# Patient Record
Sex: MALE | Race: WHITE | NOT HISPANIC OR LATINO | Employment: OTHER | URBAN - METROPOLITAN AREA
[De-identification: names, ages, dates, MRNs, and addresses within clinical notes are randomized per-mention and may not be internally consistent; named-entity substitution may affect disease eponyms.]

---

## 2019-01-01 ENCOUNTER — APPOINTMENT (OUTPATIENT)
Dept: GENERAL RADIOLOGY | Facility: HOSPITAL | Age: 79
End: 2019-01-01

## 2019-01-01 ENCOUNTER — HOSPITAL ENCOUNTER (INPATIENT)
Facility: HOSPITAL | Age: 79
LOS: 8 days | Discharge: HOSPICE | End: 2019-05-08
Attending: INTERNAL MEDICINE | Admitting: UROLOGY

## 2019-01-01 ENCOUNTER — TRANSCRIBE ORDERS (OUTPATIENT)
Dept: ADMINISTRATIVE | Facility: HOSPITAL | Age: 79
End: 2019-01-01

## 2019-01-01 ENCOUNTER — APPOINTMENT (OUTPATIENT)
Dept: CT IMAGING | Facility: HOSPITAL | Age: 79
End: 2019-01-01

## 2019-01-01 ENCOUNTER — HOSPITAL ENCOUNTER (OUTPATIENT)
Dept: CT IMAGING | Facility: HOSPITAL | Age: 79
Discharge: HOME | End: 2019-04-30

## 2019-01-01 ENCOUNTER — READMISSION MANAGEMENT (OUTPATIENT)
Dept: CALL CENTER | Facility: HOSPITAL | Age: 79
End: 2019-01-01

## 2019-01-01 ENCOUNTER — ANESTHESIA EVENT (OUTPATIENT)
Dept: PERIOP | Facility: HOSPITAL | Age: 79
End: 2019-01-01

## 2019-01-01 ENCOUNTER — ANESTHESIA (OUTPATIENT)
Dept: PERIOP | Facility: HOSPITAL | Age: 79
End: 2019-01-01

## 2019-01-01 VITALS
SYSTOLIC BLOOD PRESSURE: 154 MMHG | RESPIRATION RATE: 18 BRPM | HEIGHT: 70 IN | BODY MASS INDEX: 22.79 KG/M2 | OXYGEN SATURATION: 95 % | TEMPERATURE: 98.1 F | HEART RATE: 90 BPM | WEIGHT: 159.2 LBS | DIASTOLIC BLOOD PRESSURE: 93 MMHG

## 2019-01-01 DIAGNOSIS — N32.89 BLADDER MASS: Primary | ICD-10-CM

## 2019-01-01 DIAGNOSIS — R63.4 WEIGHT LOSS: Primary | ICD-10-CM

## 2019-01-01 DIAGNOSIS — R26.81 UNSTEADINESS ON FEET: ICD-10-CM

## 2019-01-01 DIAGNOSIS — R63.4 WEIGHT LOSS: ICD-10-CM

## 2019-01-01 LAB
ABO + RH BLD: NORMAL
ABO GROUP BLD: NORMAL
ALBUMIN SERPL-MCNC: 2.6 G/DL (ref 3.5–5.2)
ALBUMIN SERPL-MCNC: 3 G/DL (ref 3.5–5.2)
ALBUMIN SERPL-MCNC: 3.3 G/DL (ref 3.5–5.2)
ALBUMIN/GLOB SERPL: 1 G/DL
ALBUMIN/GLOB SERPL: 1 G/DL
ALP SERPL-CCNC: 67 U/L (ref 39–117)
ALP SERPL-CCNC: 70 U/L (ref 39–117)
ALT SERPL W P-5'-P-CCNC: 12 U/L (ref 1–41)
ALT SERPL W P-5'-P-CCNC: 13 U/L (ref 1–41)
ANION GAP SERPL CALCULATED.3IONS-SCNC: 10.8 MMOL/L
ANION GAP SERPL CALCULATED.3IONS-SCNC: 12.8 MMOL/L
ANION GAP SERPL CALCULATED.3IONS-SCNC: 8.6 MMOL/L
ANION GAP SERPL CALCULATED.3IONS-SCNC: 9.4 MMOL/L
ANION GAP SERPL CALCULATED.3IONS-SCNC: 9.8 MMOL/L
APTT PPP: 33.8 SECONDS (ref 22.7–35.4)
AST SERPL-CCNC: 13 U/L (ref 1–40)
AST SERPL-CCNC: 16 U/L (ref 1–40)
BACTERIA SPEC AEROBE CULT: NORMAL
BACTERIA UR QL AUTO: ABNORMAL /HPF
BASOPHILS # BLD AUTO: 0.02 10*3/MM3 (ref 0–0.2)
BASOPHILS # BLD AUTO: 0.03 10*3/MM3 (ref 0–0.2)
BASOPHILS # BLD AUTO: 0.04 10*3/MM3 (ref 0–0.2)
BASOPHILS NFR BLD AUTO: 0.2 % (ref 0–1.5)
BASOPHILS NFR BLD AUTO: 0.2 % (ref 0–1.5)
BASOPHILS NFR BLD AUTO: 0.3 % (ref 0–1.5)
BH BB BLOOD EXPIRATION DATE: NORMAL
BH BB BLOOD TYPE BARCODE: 5100
BH BB DISPENSE STATUS: NORMAL
BH BB PRODUCT CODE: NORMAL
BH BB UNIT NUMBER: NORMAL
BILIRUB SERPL-MCNC: 0.4 MG/DL (ref 0.2–1.2)
BILIRUB SERPL-MCNC: 0.5 MG/DL (ref 0.2–1.2)
BILIRUB UR QL STRIP: NEGATIVE
BLD GP AB SCN SERPL QL: NEGATIVE
BUN BLD-MCNC: 19 MG/DL (ref 8–23)
BUN BLD-MCNC: 20 MG/DL (ref 8–23)
BUN BLD-MCNC: 22 MG/DL (ref 8–23)
BUN BLD-MCNC: 30 MG/DL (ref 8–23)
BUN BLD-MCNC: 34 MG/DL (ref 8–23)
BUN/CREAT SERPL: 14.4 (ref 7–25)
BUN/CREAT SERPL: 15.8 (ref 7–25)
BUN/CREAT SERPL: 18.2 (ref 7–25)
BUN/CREAT SERPL: 20.4 (ref 7–25)
BUN/CREAT SERPL: 21.3 (ref 7–25)
CALCIUM SPEC-SCNC: 10.3 MG/DL (ref 8.6–10.5)
CALCIUM SPEC-SCNC: 10.3 MG/DL (ref 8.6–10.5)
CALCIUM SPEC-SCNC: 10.6 MG/DL (ref 8.6–10.5)
CALCIUM SPEC-SCNC: 11.2 MG/DL (ref 8.6–10.5)
CALCIUM SPEC-SCNC: 9.9 MG/DL (ref 8.6–10.5)
CHLORIDE SERPL-SCNC: 101 MMOL/L (ref 98–107)
CHLORIDE SERPL-SCNC: 103 MMOL/L (ref 98–107)
CHLORIDE SERPL-SCNC: 106 MMOL/L (ref 98–107)
CHLORIDE SERPL-SCNC: 110 MMOL/L (ref 98–107)
CHLORIDE SERPL-SCNC: 99 MMOL/L (ref 98–107)
CLARITY UR: ABNORMAL
CO2 SERPL-SCNC: 21.2 MMOL/L (ref 22–29)
CO2 SERPL-SCNC: 21.6 MMOL/L (ref 22–29)
CO2 SERPL-SCNC: 23.2 MMOL/L (ref 22–29)
CO2 SERPL-SCNC: 23.4 MMOL/L (ref 22–29)
CO2 SERPL-SCNC: 24.2 MMOL/L (ref 22–29)
COLOR UR: ABNORMAL
CREAT BLD-MCNC: 1.2 MG/DL (ref 0.76–1.27)
CREAT BLD-MCNC: 1.21 MG/DL (ref 0.76–1.27)
CREAT BLD-MCNC: 1.39 MG/DL (ref 0.76–1.27)
CREAT BLD-MCNC: 1.47 MG/DL (ref 0.76–1.27)
CREAT BLD-MCNC: 1.6 MG/DL (ref 0.76–1.27)
CYTO UR: NORMAL
D-LACTATE SERPL-SCNC: 1.3 MMOL/L (ref 0.5–2)
DEPRECATED RDW RBC AUTO: 45.5 FL (ref 37–54)
DEPRECATED RDW RBC AUTO: 45.6 FL (ref 37–54)
DEPRECATED RDW RBC AUTO: 45.7 FL (ref 37–54)
DEPRECATED RDW RBC AUTO: 46.8 FL (ref 37–54)
DEPRECATED RDW RBC AUTO: 48.1 FL (ref 37–54)
DEPRECATED RDW RBC AUTO: 48.8 FL (ref 37–54)
EOSINOPHIL # BLD AUTO: 0.01 10*3/MM3 (ref 0–0.4)
EOSINOPHIL # BLD AUTO: 0.01 10*3/MM3 (ref 0–0.4)
EOSINOPHIL # BLD AUTO: 0.12 10*3/MM3 (ref 0–0.4)
EOSINOPHIL NFR BLD AUTO: 0.1 % (ref 0.3–6.2)
EOSINOPHIL NFR BLD AUTO: 0.1 % (ref 0.3–6.2)
EOSINOPHIL NFR BLD AUTO: 1 % (ref 0.3–6.2)
ERYTHROCYTE [DISTWIDTH] IN BLOOD BY AUTOMATED COUNT: 14.8 % (ref 12.3–15.4)
ERYTHROCYTE [DISTWIDTH] IN BLOOD BY AUTOMATED COUNT: 14.8 % (ref 12.3–15.4)
ERYTHROCYTE [DISTWIDTH] IN BLOOD BY AUTOMATED COUNT: 14.9 % (ref 12.3–15.4)
ERYTHROCYTE [DISTWIDTH] IN BLOOD BY AUTOMATED COUNT: 15 % (ref 12.3–15.4)
ERYTHROCYTE [DISTWIDTH] IN BLOOD BY AUTOMATED COUNT: 15.4 % (ref 12.3–15.4)
ERYTHROCYTE [DISTWIDTH] IN BLOOD BY AUTOMATED COUNT: 15.6 % (ref 12.3–15.4)
FERRITIN SERPL-MCNC: 649 NG/ML (ref 30–400)
FOLATE SERPL-MCNC: 5.75 NG/ML (ref 4.78–24.2)
GFR SERPL CREATININE-BSD FRML MDRD: 42 ML/MIN/1.73
GFR SERPL CREATININE-BSD FRML MDRD: 46 ML/MIN/1.73
GFR SERPL CREATININE-BSD FRML MDRD: 49 ML/MIN/1.73
GFR SERPL CREATININE-BSD FRML MDRD: 58 ML/MIN/1.73
GFR SERPL CREATININE-BSD FRML MDRD: 58 ML/MIN/1.73
GLOBULIN UR ELPH-MCNC: 2.9 GM/DL
GLOBULIN UR ELPH-MCNC: 3.2 GM/DL
GLUCOSE BLD-MCNC: 101 MG/DL (ref 65–99)
GLUCOSE BLD-MCNC: 104 MG/DL (ref 65–99)
GLUCOSE BLD-MCNC: 110 MG/DL (ref 65–99)
GLUCOSE BLD-MCNC: 116 MG/DL (ref 65–99)
GLUCOSE BLD-MCNC: 98 MG/DL (ref 65–99)
GLUCOSE BLDC GLUCOMTR-MCNC: 101 MG/DL (ref 70–130)
GLUCOSE BLDC GLUCOMTR-MCNC: 102 MG/DL (ref 70–130)
GLUCOSE BLDC GLUCOMTR-MCNC: 102 MG/DL (ref 70–130)
GLUCOSE BLDC GLUCOMTR-MCNC: 106 MG/DL (ref 70–130)
GLUCOSE BLDC GLUCOMTR-MCNC: 106 MG/DL (ref 70–130)
GLUCOSE BLDC GLUCOMTR-MCNC: 107 MG/DL (ref 70–130)
GLUCOSE BLDC GLUCOMTR-MCNC: 109 MG/DL (ref 70–130)
GLUCOSE BLDC GLUCOMTR-MCNC: 109 MG/DL (ref 70–130)
GLUCOSE BLDC GLUCOMTR-MCNC: 111 MG/DL (ref 70–130)
GLUCOSE BLDC GLUCOMTR-MCNC: 113 MG/DL (ref 70–130)
GLUCOSE BLDC GLUCOMTR-MCNC: 113 MG/DL (ref 70–130)
GLUCOSE BLDC GLUCOMTR-MCNC: 116 MG/DL (ref 70–130)
GLUCOSE BLDC GLUCOMTR-MCNC: 116 MG/DL (ref 70–130)
GLUCOSE BLDC GLUCOMTR-MCNC: 118 MG/DL (ref 70–130)
GLUCOSE BLDC GLUCOMTR-MCNC: 122 MG/DL (ref 70–130)
GLUCOSE BLDC GLUCOMTR-MCNC: 123 MG/DL (ref 70–130)
GLUCOSE BLDC GLUCOMTR-MCNC: 136 MG/DL (ref 70–130)
GLUCOSE BLDC GLUCOMTR-MCNC: 156 MG/DL (ref 70–130)
GLUCOSE BLDC GLUCOMTR-MCNC: 87 MG/DL (ref 70–130)
GLUCOSE BLDC GLUCOMTR-MCNC: 89 MG/DL (ref 70–130)
GLUCOSE BLDC GLUCOMTR-MCNC: 89 MG/DL (ref 70–130)
GLUCOSE BLDC GLUCOMTR-MCNC: 91 MG/DL (ref 70–130)
GLUCOSE BLDC GLUCOMTR-MCNC: 93 MG/DL (ref 70–130)
GLUCOSE BLDC GLUCOMTR-MCNC: 93 MG/DL (ref 70–130)
GLUCOSE BLDC GLUCOMTR-MCNC: 95 MG/DL (ref 70–130)
GLUCOSE BLDC GLUCOMTR-MCNC: 96 MG/DL (ref 70–130)
GLUCOSE BLDC GLUCOMTR-MCNC: 97 MG/DL (ref 70–130)
GLUCOSE BLDC GLUCOMTR-MCNC: 97 MG/DL (ref 70–130)
GLUCOSE UR STRIP-MCNC: NEGATIVE MG/DL
HBA1C MFR BLD: 5.2 % (ref 4.8–5.6)
HCT VFR BLD AUTO: 22.4 % (ref 37.5–51)
HCT VFR BLD AUTO: 26.6 % (ref 37.5–51)
HCT VFR BLD AUTO: 27.8 % (ref 37.5–51)
HCT VFR BLD AUTO: 29 % (ref 37.5–51)
HCT VFR BLD AUTO: 29.2 % (ref 37.5–51)
HCT VFR BLD AUTO: 30 % (ref 37.5–51)
HGB BLD-MCNC: 7.2 G/DL (ref 13–17.7)
HGB BLD-MCNC: 8.3 G/DL (ref 13–17.7)
HGB BLD-MCNC: 8.6 G/DL (ref 13–17.7)
HGB BLD-MCNC: 9.2 G/DL (ref 13–17.7)
HGB BLD-MCNC: 9.3 G/DL (ref 13–17.7)
HGB BLD-MCNC: 9.8 G/DL (ref 13–17.7)
HGB UR QL STRIP.AUTO: ABNORMAL
HYALINE CASTS UR QL AUTO: ABNORMAL /LPF
IMM GRANULOCYTES # BLD AUTO: 0.08 10*3/MM3 (ref 0–0.05)
IMM GRANULOCYTES # BLD AUTO: 0.08 10*3/MM3 (ref 0–0.05)
IMM GRANULOCYTES # BLD AUTO: 0.11 10*3/MM3 (ref 0–0.05)
IMM GRANULOCYTES NFR BLD AUTO: 0.6 % (ref 0–0.5)
IMM GRANULOCYTES NFR BLD AUTO: 0.6 % (ref 0–0.5)
IMM GRANULOCYTES NFR BLD AUTO: 1 % (ref 0–0.5)
INR PPP: 1.24 (ref 0.9–1.1)
INR PPP: 1.32 (ref 0.9–1.1)
IRON 24H UR-MRATE: 13 MCG/DL (ref 59–158)
IRON 24H UR-MRATE: 13 MCG/DL (ref 59–158)
IRON SATN MFR SERPL: 6 % (ref 20–50)
KETONES UR QL STRIP: NEGATIVE
LAB AP CASE REPORT: NORMAL
LAB AP DIAGNOSIS COMMENT: NORMAL
LAB AP SPECIAL STAINS: NORMAL
LAB AP SYNOPTIC CHECKLIST: NORMAL
LEUKOCYTE ESTERASE UR QL STRIP.AUTO: ABNORMAL
LYMPHOCYTES # BLD AUTO: 0.41 10*3/MM3 (ref 0.7–3.1)
LYMPHOCYTES # BLD AUTO: 0.42 10*3/MM3 (ref 0.7–3.1)
LYMPHOCYTES # BLD AUTO: 0.51 10*3/MM3 (ref 0.7–3.1)
LYMPHOCYTES NFR BLD AUTO: 3.3 % (ref 19.6–45.3)
LYMPHOCYTES NFR BLD AUTO: 3.5 % (ref 19.6–45.3)
LYMPHOCYTES NFR BLD AUTO: 3.5 % (ref 19.6–45.3)
Lab: NORMAL
MCH RBC QN AUTO: 26.4 PG (ref 26.6–33)
MCH RBC QN AUTO: 26.5 PG (ref 26.6–33)
MCH RBC QN AUTO: 26.9 PG (ref 26.6–33)
MCH RBC QN AUTO: 27.2 PG (ref 26.6–33)
MCH RBC QN AUTO: 27.6 PG (ref 26.6–33)
MCH RBC QN AUTO: 27.6 PG (ref 26.6–33)
MCHC RBC AUTO-ENTMCNC: 30.9 G/DL (ref 31.5–35.7)
MCHC RBC AUTO-ENTMCNC: 31.2 G/DL (ref 31.5–35.7)
MCHC RBC AUTO-ENTMCNC: 31.5 G/DL (ref 31.5–35.7)
MCHC RBC AUTO-ENTMCNC: 32.1 G/DL (ref 31.5–35.7)
MCHC RBC AUTO-ENTMCNC: 32.1 G/DL (ref 31.5–35.7)
MCHC RBC AUTO-ENTMCNC: 32.7 G/DL (ref 31.5–35.7)
MCV RBC AUTO: 83.6 FL (ref 79–97)
MCV RBC AUTO: 84.5 FL (ref 79–97)
MCV RBC AUTO: 84.7 FL (ref 79–97)
MCV RBC AUTO: 85.5 FL (ref 79–97)
MCV RBC AUTO: 86.1 FL (ref 79–97)
MCV RBC AUTO: 86.4 FL (ref 79–97)
MONOCYTES # BLD AUTO: 0.63 10*3/MM3 (ref 0.1–0.9)
MONOCYTES # BLD AUTO: 0.77 10*3/MM3 (ref 0.1–0.9)
MONOCYTES # BLD AUTO: 0.8 10*3/MM3 (ref 0.1–0.9)
MONOCYTES NFR BLD AUTO: 5.4 % (ref 5–12)
MONOCYTES NFR BLD AUTO: 5.5 % (ref 5–12)
MONOCYTES NFR BLD AUTO: 6.1 % (ref 5–12)
NEUTROPHILS # BLD AUTO: 10.25 10*3/MM3 (ref 1.7–7)
NEUTROPHILS # BLD AUTO: 11.38 10*3/MM3 (ref 1.7–7)
NEUTROPHILS # BLD AUTO: 13.02 10*3/MM3 (ref 1.7–7)
NEUTROPHILS NFR BLD AUTO: 88.8 % (ref 42.7–76)
NEUTROPHILS NFR BLD AUTO: 89.7 % (ref 42.7–76)
NEUTROPHILS NFR BLD AUTO: 90.1 % (ref 42.7–76)
NITRITE UR QL STRIP: NEGATIVE
NRBC BLD AUTO-RTO: 0 /100 WBC (ref 0–0.2)
NT-PROBNP SERPL-MCNC: 670.4 PG/ML (ref 5–1800)
PATH REPORT.ADDENDUM SPEC: NORMAL
PATH REPORT.FINAL DX SPEC: NORMAL
PATH REPORT.GROSS SPEC: NORMAL
PH UR STRIP.AUTO: <=5 [PH] (ref 5–8)
PHOSPHATE SERPL-MCNC: 2 MG/DL (ref 2.5–4.5)
PLATELET # BLD AUTO: 320 10*3/MM3 (ref 140–450)
PLATELET # BLD AUTO: 334 10*3/MM3 (ref 140–450)
PLATELET # BLD AUTO: 345 10*3/MM3 (ref 140–450)
PLATELET # BLD AUTO: 345 10*3/MM3 (ref 140–450)
PLATELET # BLD AUTO: 351 10*3/MM3 (ref 140–450)
PLATELET # BLD AUTO: 362 10*3/MM3 (ref 140–450)
PMV BLD AUTO: 8.5 FL (ref 6–12)
PMV BLD AUTO: 8.6 FL (ref 6–12)
PMV BLD AUTO: 8.7 FL (ref 6–12)
PMV BLD AUTO: 9 FL (ref 6–12)
PMV BLD AUTO: 9.1 FL (ref 6–12)
PMV BLD AUTO: 9.3 FL (ref 6–12)
POTASSIUM BLD-SCNC: 3.6 MMOL/L (ref 3.5–5.2)
POTASSIUM BLD-SCNC: 3.7 MMOL/L (ref 3.5–5.2)
POTASSIUM BLD-SCNC: 3.7 MMOL/L (ref 3.5–5.2)
POTASSIUM BLD-SCNC: 4 MMOL/L (ref 3.5–5.2)
POTASSIUM BLD-SCNC: 4.2 MMOL/L (ref 3.5–5.2)
PROCALCITONIN SERPL-MCNC: 0.19 NG/ML (ref 0.1–0.25)
PROT SERPL-MCNC: 5.9 G/DL (ref 6–8.5)
PROT SERPL-MCNC: 6.5 G/DL (ref 6–8.5)
PROT UR QL STRIP: ABNORMAL
PROTHROMBIN TIME: 15.3 SECONDS (ref 11.7–14.2)
PROTHROMBIN TIME: 16.1 SECONDS (ref 11.7–14.2)
RBC # BLD AUTO: 2.68 10*6/MM3 (ref 4.14–5.8)
RBC # BLD AUTO: 3.14 10*6/MM3 (ref 4.14–5.8)
RBC # BLD AUTO: 3.25 10*6/MM3 (ref 4.14–5.8)
RBC # BLD AUTO: 3.37 10*6/MM3 (ref 4.14–5.8)
RBC # BLD AUTO: 3.38 10*6/MM3 (ref 4.14–5.8)
RBC # BLD AUTO: 3.55 10*6/MM3 (ref 4.14–5.8)
RBC # UR: ABNORMAL /HPF
REF LAB TEST METHOD: ABNORMAL
RETICS # AUTO: 0.02 10*6/MM3 (ref 0.02–0.13)
RETICS/RBC NFR AUTO: 0.57 % (ref 0.7–1.9)
RH BLD: POSITIVE
SODIUM BLD-SCNC: 133 MMOL/L (ref 136–145)
SODIUM BLD-SCNC: 136 MMOL/L (ref 136–145)
SODIUM BLD-SCNC: 137 MMOL/L (ref 136–145)
SODIUM BLD-SCNC: 137 MMOL/L (ref 136–145)
SODIUM BLD-SCNC: 141 MMOL/L (ref 136–145)
SP GR UR STRIP: 1.03 (ref 1–1.03)
SQUAMOUS #/AREA URNS HPF: ABNORMAL /HPF
T&S EXPIRATION DATE: NORMAL
TIBC SERPL-MCNC: 207 MCG/DL (ref 298–536)
TRANS CELLS #/AREA URNS HPF: ABNORMAL /HPF
TRANSFERRIN SERPL-MCNC: 139 MG/DL (ref 200–360)
TSH SERPL DL<=0.05 MIU/L-ACNC: 0.97 MIU/ML (ref 0.27–4.2)
UNIT  ABO: NORMAL
UNIT  RH: NORMAL
UROBILINOGEN UR QL STRIP: ABNORMAL
VIT B12 BLD-MCNC: 554 PG/ML (ref 211–946)
WBC NRBC COR # BLD: 10.74 10*3/MM3 (ref 3.4–10.8)
WBC NRBC COR # BLD: 11.56 10*3/MM3 (ref 3.4–10.8)
WBC NRBC COR # BLD: 11.94 10*3/MM3 (ref 3.4–10.8)
WBC NRBC COR # BLD: 12.52 10*3/MM3 (ref 3.4–10.8)
WBC NRBC COR # BLD: 12.68 10*3/MM3 (ref 3.4–10.8)
WBC NRBC COR # BLD: 14.45 10*3/MM3 (ref 3.4–10.8)
WBC UR QL AUTO: ABNORMAL /HPF

## 2019-01-01 PROCEDURE — 82728 ASSAY OF FERRITIN: CPT | Performed by: INTERNAL MEDICINE

## 2019-01-01 PROCEDURE — 97162 PT EVAL MOD COMPLEX 30 MIN: CPT

## 2019-01-01 PROCEDURE — 88307 TISSUE EXAM BY PATHOLOGIST: CPT | Performed by: UROLOGY

## 2019-01-01 PROCEDURE — 85045 AUTOMATED RETICULOCYTE COUNT: CPT | Performed by: INTERNAL MEDICINE

## 2019-01-01 PROCEDURE — 0TCB8ZZ EXTIRPATION OF MATTER FROM BLADDER, VIA NATURAL OR ARTIFICIAL OPENING ENDOSCOPIC: ICD-10-PCS | Performed by: HOSPITALIST

## 2019-01-01 PROCEDURE — 25010000003 CEFOXITIN PER 1 G: Performed by: INTERNAL MEDICINE

## 2019-01-01 PROCEDURE — 82962 GLUCOSE BLOOD TEST: CPT

## 2019-01-01 PROCEDURE — 80053 COMPREHEN METABOLIC PANEL: CPT | Performed by: NURSE PRACTITIONER

## 2019-01-01 PROCEDURE — 97110 THERAPEUTIC EXERCISES: CPT

## 2019-01-01 PROCEDURE — 88341 IMHCHEM/IMCYTCHM EA ADD ANTB: CPT | Performed by: UROLOGY

## 2019-01-01 PROCEDURE — 99232 SBSQ HOSP IP/OBS MODERATE 35: CPT | Performed by: INTERNAL MEDICINE

## 2019-01-01 PROCEDURE — 86850 RBC ANTIBODY SCREEN: CPT | Performed by: HOSPITALIST

## 2019-01-01 PROCEDURE — 99221 1ST HOSP IP/OBS SF/LOW 40: CPT | Performed by: INTERNAL MEDICINE

## 2019-01-01 PROCEDURE — 80069 RENAL FUNCTION PANEL: CPT | Performed by: HOSPITALIST

## 2019-01-01 PROCEDURE — 36430 TRANSFUSION BLD/BLD COMPNT: CPT

## 2019-01-01 PROCEDURE — 25010000002 HYDROMORPHONE PER 4 MG: Performed by: NURSE ANESTHETIST, CERTIFIED REGISTERED

## 2019-01-01 PROCEDURE — 74420 UROGRAPHY RTRGR +-KUB: CPT

## 2019-01-01 PROCEDURE — 86900 BLOOD TYPING SEROLOGIC ABO: CPT

## 2019-01-01 PROCEDURE — 86923 COMPATIBILITY TEST ELECTRIC: CPT

## 2019-01-01 PROCEDURE — 85610 PROTHROMBIN TIME: CPT | Performed by: INTERNAL MEDICINE

## 2019-01-01 PROCEDURE — 84443 ASSAY THYROID STIM HORMONE: CPT | Performed by: INTERNAL MEDICINE

## 2019-01-01 PROCEDURE — 83036 HEMOGLOBIN GLYCOSYLATED A1C: CPT | Performed by: INTERNAL MEDICINE

## 2019-01-01 PROCEDURE — 82607 VITAMIN B-12: CPT | Performed by: INTERNAL MEDICINE

## 2019-01-01 PROCEDURE — BT1DZZZ FLUOROSCOPY OF RIGHT KIDNEY, URETER AND BLADDER: ICD-10-PCS | Performed by: HOSPITALIST

## 2019-01-01 PROCEDURE — 85027 COMPLETE CBC AUTOMATED: CPT | Performed by: NURSE PRACTITIONER

## 2019-01-01 PROCEDURE — 86900 BLOOD TYPING SEROLOGIC ABO: CPT | Performed by: HOSPITALIST

## 2019-01-01 PROCEDURE — 87086 URINE CULTURE/COLONY COUNT: CPT | Performed by: INTERNAL MEDICINE

## 2019-01-01 PROCEDURE — 85610 PROTHROMBIN TIME: CPT | Performed by: HOSPITALIST

## 2019-01-01 PROCEDURE — 25010000002 FENTANYL CITRATE (PF) 100 MCG/2ML SOLUTION: Performed by: NURSE ANESTHETIST, CERTIFIED REGISTERED

## 2019-01-01 PROCEDURE — 25010000003 CEFOXITIN PER 1 G: Performed by: HOSPITALIST

## 2019-01-01 PROCEDURE — 86901 BLOOD TYPING SEROLOGIC RH(D): CPT

## 2019-01-01 PROCEDURE — 0T9630Z DRAINAGE OF RIGHT URETER WITH DRAINAGE DEVICE, PERCUTANEOUS APPROACH: ICD-10-PCS | Performed by: RADIOLOGY

## 2019-01-01 PROCEDURE — 25010000002 IRON SUCROSE PER 1 MG: Performed by: HOSPITALIST

## 2019-01-01 PROCEDURE — 85025 COMPLETE CBC W/AUTO DIFF WBC: CPT | Performed by: INTERNAL MEDICINE

## 2019-01-01 PROCEDURE — 85025 COMPLETE CBC W/AUTO DIFF WBC: CPT | Performed by: NURSE PRACTITIONER

## 2019-01-01 PROCEDURE — 88360 TUMOR IMMUNOHISTOCHEM/MANUAL: CPT

## 2019-01-01 PROCEDURE — 25010000002 IOPAMIDOL 61 % SOLUTION: Performed by: RADIOLOGY

## 2019-01-01 PROCEDURE — 93005 ELECTROCARDIOGRAM TRACING: CPT | Performed by: ANESTHESIOLOGY

## 2019-01-01 PROCEDURE — 99233 SBSQ HOSP IP/OBS HIGH 50: CPT | Performed by: INTERNAL MEDICINE

## 2019-01-01 PROCEDURE — 0 IOTHALAMATE 60 % SOLUTION: Performed by: UROLOGY

## 2019-01-01 PROCEDURE — C2617 STENT, NON-COR, TEM W/O DEL: HCPCS

## 2019-01-01 PROCEDURE — 70450 CT HEAD/BRAIN W/O DYE: CPT

## 2019-01-01 PROCEDURE — 25010000002 DEXAMETHASONE PER 1 MG: Performed by: NURSE ANESTHETIST, CERTIFIED REGISTERED

## 2019-01-01 PROCEDURE — 93010 ELECTROCARDIOGRAM REPORT: CPT | Performed by: INTERNAL MEDICINE

## 2019-01-01 PROCEDURE — 85027 COMPLETE CBC AUTOMATED: CPT | Performed by: HOSPITALIST

## 2019-01-01 PROCEDURE — 83540 ASSAY OF IRON: CPT | Performed by: INTERNAL MEDICINE

## 2019-01-01 PROCEDURE — 92610 EVALUATE SWALLOWING FUNCTION: CPT

## 2019-01-01 PROCEDURE — 80048 BASIC METABOLIC PNL TOTAL CA: CPT | Performed by: INTERNAL MEDICINE

## 2019-01-01 PROCEDURE — 25010000002 ONDANSETRON PER 1 MG: Performed by: NURSE ANESTHETIST, CERTIFIED REGISTERED

## 2019-01-01 PROCEDURE — 71250 CT THORAX DX C-: CPT

## 2019-01-01 PROCEDURE — 83880 ASSAY OF NATRIURETIC PEPTIDE: CPT | Performed by: INTERNAL MEDICINE

## 2019-01-01 PROCEDURE — 80048 BASIC METABOLIC PNL TOTAL CA: CPT | Performed by: NURSE PRACTITIONER

## 2019-01-01 PROCEDURE — 84466 ASSAY OF TRANSFERRIN: CPT | Performed by: INTERNAL MEDICINE

## 2019-01-01 PROCEDURE — 81001 URINALYSIS AUTO W/SCOPE: CPT | Performed by: INTERNAL MEDICINE

## 2019-01-01 PROCEDURE — 74176 CT ABD & PELVIS W/O CONTRAST: CPT

## 2019-01-01 PROCEDURE — C1758 CATHETER, URETERAL: HCPCS | Performed by: UROLOGY

## 2019-01-01 PROCEDURE — 83605 ASSAY OF LACTIC ACID: CPT | Performed by: INTERNAL MEDICINE

## 2019-01-01 PROCEDURE — 99222 1ST HOSP IP/OBS MODERATE 55: CPT | Performed by: INTERNAL MEDICINE

## 2019-01-01 PROCEDURE — 87040 BLOOD CULTURE FOR BACTERIA: CPT | Performed by: INTERNAL MEDICINE

## 2019-01-01 PROCEDURE — 25010000002 SUCCINYLCHOLINE PER 20 MG: Performed by: NURSE ANESTHETIST, CERTIFIED REGISTERED

## 2019-01-01 PROCEDURE — 25010000002 PROPOFOL 10 MG/ML EMULSION: Performed by: NURSE ANESTHETIST, CERTIFIED REGISTERED

## 2019-01-01 PROCEDURE — 71045 X-RAY EXAM CHEST 1 VIEW: CPT

## 2019-01-01 PROCEDURE — 85027 COMPLETE CBC AUTOMATED: CPT | Performed by: INTERNAL MEDICINE

## 2019-01-01 PROCEDURE — 84145 PROCALCITONIN (PCT): CPT | Performed by: INTERNAL MEDICINE

## 2019-01-01 PROCEDURE — C1769 GUIDE WIRE: HCPCS | Performed by: UROLOGY

## 2019-01-01 PROCEDURE — 86901 BLOOD TYPING SEROLOGIC RH(D): CPT | Performed by: HOSPITALIST

## 2019-01-01 PROCEDURE — 25010000002: Performed by: HOSPITALIST

## 2019-01-01 PROCEDURE — 80053 COMPREHEN METABOLIC PANEL: CPT | Performed by: INTERNAL MEDICINE

## 2019-01-01 PROCEDURE — P9016 RBC LEUKOCYTES REDUCED: HCPCS

## 2019-01-01 PROCEDURE — 85730 THROMBOPLASTIN TIME PARTIAL: CPT | Performed by: HOSPITALIST

## 2019-01-01 PROCEDURE — 88342 IMHCHEM/IMCYTCHM 1ST ANTB: CPT | Performed by: UROLOGY

## 2019-01-01 PROCEDURE — 82746 ASSAY OF FOLIC ACID SERUM: CPT | Performed by: INTERNAL MEDICINE

## 2019-01-01 RX ORDER — HYDROCODONE BITARTRATE AND ACETAMINOPHEN 7.5; 325 MG/1; MG/1
1 TABLET ORAL EVERY 6 HOURS PRN
Start: 2019-01-01 | End: 2019-01-01

## 2019-01-01 RX ORDER — SODIUM CHLORIDE 0.9 % (FLUSH) 0.9 %
3-10 SYRINGE (ML) INJECTION AS NEEDED
Status: DISCONTINUED | OUTPATIENT
Start: 2019-01-01 | End: 2019-01-01 | Stop reason: HOSPADM

## 2019-01-01 RX ORDER — GUAIFENESIN 600 MG/1
600 TABLET, EXTENDED RELEASE ORAL 2 TIMES DAILY
Status: DISCONTINUED | OUTPATIENT
Start: 2019-01-01 | End: 2019-01-01 | Stop reason: HOSPADM

## 2019-01-01 RX ORDER — HYDROMORPHONE HCL 110MG/55ML
PATIENT CONTROLLED ANALGESIA SYRINGE INTRAVENOUS AS NEEDED
Status: DISCONTINUED | OUTPATIENT
Start: 2019-01-01 | End: 2019-01-01 | Stop reason: SURG

## 2019-01-01 RX ORDER — PROMETHAZINE HYDROCHLORIDE 25 MG/ML
12.5 INJECTION, SOLUTION INTRAMUSCULAR; INTRAVENOUS ONCE AS NEEDED
Status: DISCONTINUED | OUTPATIENT
Start: 2019-01-01 | End: 2019-01-01 | Stop reason: HOSPADM

## 2019-01-01 RX ORDER — FAMOTIDINE 20 MG/1
20 TABLET, FILM COATED ORAL 2 TIMES DAILY
Status: DISCONTINUED | OUTPATIENT
Start: 2019-01-01 | End: 2019-01-01 | Stop reason: HOSPADM

## 2019-01-01 RX ORDER — DIPHENHYDRAMINE HCL 25 MG
25 CAPSULE ORAL
Status: DISCONTINUED | OUTPATIENT
Start: 2019-01-01 | End: 2019-01-01 | Stop reason: HOSPADM

## 2019-01-01 RX ORDER — HYDROMORPHONE HYDROCHLORIDE 1 MG/ML
0.5 INJECTION, SOLUTION INTRAMUSCULAR; INTRAVENOUS; SUBCUTANEOUS
Status: DISCONTINUED | OUTPATIENT
Start: 2019-01-01 | End: 2019-01-01 | Stop reason: HOSPADM

## 2019-01-01 RX ORDER — NICOTINE POLACRILEX 4 MG
15 LOZENGE BUCCAL
Status: DISCONTINUED | OUTPATIENT
Start: 2019-01-01 | End: 2019-01-01 | Stop reason: HOSPADM

## 2019-01-01 RX ORDER — CLINDAMYCIN PHOSPHATE 300 MG/50ML
300 INJECTION INTRAVENOUS EVERY 8 HOURS
Status: DISCONTINUED | OUTPATIENT
Start: 2019-01-01 | End: 2019-01-01

## 2019-01-01 RX ORDER — SODIUM CHLORIDE 0.9 % (FLUSH) 0.9 %
3 SYRINGE (ML) INJECTION EVERY 12 HOURS SCHEDULED
Status: DISCONTINUED | OUTPATIENT
Start: 2019-01-01 | End: 2019-01-01 | Stop reason: HOSPADM

## 2019-01-01 RX ORDER — HYDROCODONE BITARTRATE AND ACETAMINOPHEN 7.5; 325 MG/1; MG/1
1 TABLET ORAL EVERY 6 HOURS PRN
Status: DISCONTINUED | OUTPATIENT
Start: 2019-01-01 | End: 2019-01-01 | Stop reason: HOSPADM

## 2019-01-01 RX ORDER — NITROGLYCERIN 0.4 MG/1
0.4 TABLET SUBLINGUAL
Status: DISCONTINUED | OUTPATIENT
Start: 2019-01-01 | End: 2019-01-01 | Stop reason: HOSPADM

## 2019-01-01 RX ORDER — LIDOCAINE HYDROCHLORIDE 20 MG/ML
INJECTION, SOLUTION INFILTRATION; PERINEURAL AS NEEDED
Status: DISCONTINUED | OUTPATIENT
Start: 2019-01-01 | End: 2019-01-01 | Stop reason: SURG

## 2019-01-01 RX ORDER — SODIUM CHLORIDE, SODIUM LACTATE, POTASSIUM CHLORIDE, CALCIUM CHLORIDE 600; 310; 30; 20 MG/100ML; MG/100ML; MG/100ML; MG/100ML
9 INJECTION, SOLUTION INTRAVENOUS CONTINUOUS
Status: DISCONTINUED | OUTPATIENT
Start: 2019-01-01 | End: 2019-01-01

## 2019-01-01 RX ORDER — LIDOCAINE HYDROCHLORIDE 10 MG/ML
0.5 INJECTION, SOLUTION EPIDURAL; INFILTRATION; INTRACAUDAL; PERINEURAL ONCE AS NEEDED
Status: DISCONTINUED | OUTPATIENT
Start: 2019-01-01 | End: 2019-01-01 | Stop reason: HOSPADM

## 2019-01-01 RX ORDER — ACETAMINOPHEN 325 MG/1
650 TABLET ORAL ONCE
Status: DISCONTINUED | OUTPATIENT
Start: 2019-01-01 | End: 2019-01-01

## 2019-01-01 RX ORDER — ACETAMINOPHEN 325 MG/1
650 TABLET ORAL EVERY 6 HOURS PRN
Status: DISCONTINUED | OUTPATIENT
Start: 2019-01-01 | End: 2019-01-01

## 2019-01-01 RX ORDER — FENTANYL CITRATE 50 UG/ML
INJECTION, SOLUTION INTRAMUSCULAR; INTRAVENOUS AS NEEDED
Status: DISCONTINUED | OUTPATIENT
Start: 2019-01-01 | End: 2019-01-01 | Stop reason: SURG

## 2019-01-01 RX ORDER — SODIUM CHLORIDE 0.9 % (FLUSH) 0.9 %
1-10 SYRINGE (ML) INJECTION AS NEEDED
Status: DISCONTINUED | OUTPATIENT
Start: 2019-01-01 | End: 2019-01-01 | Stop reason: HOSPADM

## 2019-01-01 RX ORDER — DIPHENHYDRAMINE HYDROCHLORIDE 50 MG/ML
12.5 INJECTION INTRAMUSCULAR; INTRAVENOUS
Status: DISCONTINUED | OUTPATIENT
Start: 2019-01-01 | End: 2019-01-01 | Stop reason: HOSPADM

## 2019-01-01 RX ORDER — OXYCODONE AND ACETAMINOPHEN 7.5; 325 MG/1; MG/1
1 TABLET ORAL EVERY 4 HOURS PRN
Status: DISCONTINUED | OUTPATIENT
Start: 2019-01-01 | End: 2019-01-01 | Stop reason: HOSPADM

## 2019-01-01 RX ORDER — MAGNESIUM HYDROXIDE 1200 MG/15ML
LIQUID ORAL AS NEEDED
Status: DISCONTINUED | OUTPATIENT
Start: 2019-01-01 | End: 2019-01-01 | Stop reason: HOSPADM

## 2019-01-01 RX ORDER — NALOXONE HCL 0.4 MG/ML
0.2 VIAL (ML) INJECTION AS NEEDED
Status: DISCONTINUED | OUTPATIENT
Start: 2019-01-01 | End: 2019-01-01 | Stop reason: HOSPADM

## 2019-01-01 RX ORDER — ONDANSETRON 2 MG/ML
4 INJECTION INTRAMUSCULAR; INTRAVENOUS EVERY 6 HOURS PRN
Status: DISCONTINUED | OUTPATIENT
Start: 2019-01-01 | End: 2019-01-01

## 2019-01-01 RX ORDER — PANTOPRAZOLE SODIUM 40 MG/1
40 TABLET, DELAYED RELEASE ORAL
Status: DISCONTINUED | OUTPATIENT
Start: 2019-01-01 | End: 2019-01-01

## 2019-01-01 RX ORDER — EPHEDRINE SULFATE 50 MG/ML
5 INJECTION, SOLUTION INTRAVENOUS ONCE AS NEEDED
Status: DISCONTINUED | OUTPATIENT
Start: 2019-01-01 | End: 2019-01-01 | Stop reason: HOSPADM

## 2019-01-01 RX ORDER — ONDANSETRON 2 MG/ML
4 INJECTION INTRAMUSCULAR; INTRAVENOUS EVERY 6 HOURS PRN
Status: DISCONTINUED | OUTPATIENT
Start: 2019-01-01 | End: 2019-01-01 | Stop reason: HOSPADM

## 2019-01-01 RX ORDER — FLUMAZENIL 0.1 MG/ML
0.2 INJECTION INTRAVENOUS AS NEEDED
Status: DISCONTINUED | OUTPATIENT
Start: 2019-01-01 | End: 2019-01-01 | Stop reason: HOSPADM

## 2019-01-01 RX ORDER — OXYCODONE AND ACETAMINOPHEN 7.5; 325 MG/1; MG/1
1 TABLET ORAL ONCE AS NEEDED
Status: DISCONTINUED | OUTPATIENT
Start: 2019-01-01 | End: 2019-01-01 | Stop reason: HOSPADM

## 2019-01-01 RX ORDER — SUCCINYLCHOLINE CHLORIDE 20 MG/ML
INJECTION INTRAMUSCULAR; INTRAVENOUS AS NEEDED
Status: DISCONTINUED | OUTPATIENT
Start: 2019-01-01 | End: 2019-01-01 | Stop reason: SURG

## 2019-01-01 RX ORDER — ONDANSETRON 4 MG/1
4 TABLET, FILM COATED ORAL EVERY 6 HOURS PRN
Status: DISCONTINUED | OUTPATIENT
Start: 2019-01-01 | End: 2019-01-01 | Stop reason: HOSPADM

## 2019-01-01 RX ORDER — DEXAMETHASONE SODIUM PHOSPHATE 4 MG/ML
INJECTION, SOLUTION INTRA-ARTICULAR; INTRALESIONAL; INTRAMUSCULAR; INTRAVENOUS; SOFT TISSUE AS NEEDED
Status: DISCONTINUED | OUTPATIENT
Start: 2019-01-01 | End: 2019-01-01 | Stop reason: SURG

## 2019-01-01 RX ORDER — PANTOPRAZOLE SODIUM 40 MG/10ML
40 INJECTION, POWDER, LYOPHILIZED, FOR SOLUTION INTRAVENOUS EVERY 12 HOURS SCHEDULED
Status: DISCONTINUED | OUTPATIENT
Start: 2019-01-01 | End: 2019-01-01

## 2019-01-01 RX ORDER — HYDRALAZINE HYDROCHLORIDE 20 MG/ML
5 INJECTION INTRAMUSCULAR; INTRAVENOUS
Status: DISCONTINUED | OUTPATIENT
Start: 2019-01-01 | End: 2019-01-01 | Stop reason: HOSPADM

## 2019-01-01 RX ORDER — PRAVASTATIN SODIUM 40 MG
40 TABLET ORAL DAILY
COMMUNITY
End: 2019-01-01 | Stop reason: HOSPADM

## 2019-01-01 RX ORDER — PROMETHAZINE HYDROCHLORIDE 25 MG/1
25 TABLET ORAL ONCE AS NEEDED
Status: DISCONTINUED | OUTPATIENT
Start: 2019-01-01 | End: 2019-01-01 | Stop reason: HOSPADM

## 2019-01-01 RX ORDER — HYDROCODONE BITARTRATE AND ACETAMINOPHEN 7.5; 325 MG/1; MG/1
1 TABLET ORAL ONCE AS NEEDED
Status: DISCONTINUED | OUTPATIENT
Start: 2019-01-01 | End: 2019-01-01 | Stop reason: HOSPADM

## 2019-01-01 RX ORDER — ACETAMINOPHEN 325 MG/1
650 TABLET ORAL EVERY 4 HOURS PRN
Status: DISCONTINUED | OUTPATIENT
Start: 2019-01-01 | End: 2019-01-01 | Stop reason: HOSPADM

## 2019-01-01 RX ORDER — DEXTROSE MONOHYDRATE 25 G/50ML
25 INJECTION, SOLUTION INTRAVENOUS
Status: DISCONTINUED | OUTPATIENT
Start: 2019-01-01 | End: 2019-01-01 | Stop reason: HOSPADM

## 2019-01-01 RX ORDER — LIDOCAINE HYDROCHLORIDE 10 MG/ML
10 INJECTION, SOLUTION INFILTRATION; PERINEURAL ONCE
Status: COMPLETED | OUTPATIENT
Start: 2019-01-01 | End: 2019-01-01

## 2019-01-01 RX ORDER — ONDANSETRON 2 MG/ML
INJECTION INTRAMUSCULAR; INTRAVENOUS AS NEEDED
Status: DISCONTINUED | OUTPATIENT
Start: 2019-01-01 | End: 2019-01-01 | Stop reason: SURG

## 2019-01-01 RX ORDER — ONDANSETRON 2 MG/ML
4 INJECTION INTRAMUSCULAR; INTRAVENOUS ONCE AS NEEDED
Status: DISCONTINUED | OUTPATIENT
Start: 2019-01-01 | End: 2019-01-01 | Stop reason: HOSPADM

## 2019-01-01 RX ORDER — DIPHENHYDRAMINE HCL 25 MG
25 CAPSULE ORAL ONCE
Status: DISCONTINUED | OUTPATIENT
Start: 2019-01-01 | End: 2019-01-01

## 2019-01-01 RX ORDER — PRAVASTATIN SODIUM 20 MG
40 TABLET ORAL DAILY
Status: DISCONTINUED | OUTPATIENT
Start: 2019-01-01 | End: 2019-01-01 | Stop reason: HOSPADM

## 2019-01-01 RX ORDER — FAMOTIDINE 10 MG/ML
20 INJECTION, SOLUTION INTRAVENOUS ONCE
Status: COMPLETED | OUTPATIENT
Start: 2019-01-01 | End: 2019-01-01

## 2019-01-01 RX ORDER — FENTANYL CITRATE 50 UG/ML
50 INJECTION, SOLUTION INTRAMUSCULAR; INTRAVENOUS
Status: DISCONTINUED | OUTPATIENT
Start: 2019-01-01 | End: 2019-01-01 | Stop reason: HOSPADM

## 2019-01-01 RX ORDER — PROMETHAZINE HYDROCHLORIDE 25 MG/1
25 SUPPOSITORY RECTAL ONCE AS NEEDED
Status: DISCONTINUED | OUTPATIENT
Start: 2019-01-01 | End: 2019-01-01 | Stop reason: HOSPADM

## 2019-01-01 RX ORDER — PROPOFOL 10 MG/ML
VIAL (ML) INTRAVENOUS AS NEEDED
Status: DISCONTINUED | OUTPATIENT
Start: 2019-01-01 | End: 2019-01-01 | Stop reason: SURG

## 2019-01-01 RX ORDER — LABETALOL HYDROCHLORIDE 5 MG/ML
5 INJECTION, SOLUTION INTRAVENOUS
Status: DISCONTINUED | OUTPATIENT
Start: 2019-01-01 | End: 2019-01-01 | Stop reason: HOSPADM

## 2019-01-01 RX ORDER — NALOXONE HCL 0.4 MG/ML
0.1 VIAL (ML) INJECTION
Status: DISCONTINUED | OUTPATIENT
Start: 2019-01-01 | End: 2019-01-01 | Stop reason: HOSPADM

## 2019-01-01 RX ORDER — SODIUM CHLORIDE 9 MG/ML
75 INJECTION, SOLUTION INTRAVENOUS CONTINUOUS
Status: DISCONTINUED | OUTPATIENT
Start: 2019-01-01 | End: 2019-01-01

## 2019-01-01 RX ORDER — CEFDINIR 300 MG/1
300 CAPSULE ORAL EVERY 12 HOURS SCHEDULED
Status: COMPLETED | OUTPATIENT
Start: 2019-01-01 | End: 2019-01-01

## 2019-01-01 RX ORDER — ACETAMINOPHEN 325 MG/1
650 TABLET ORAL ONCE AS NEEDED
Status: DISCONTINUED | OUTPATIENT
Start: 2019-01-01 | End: 2019-01-01 | Stop reason: HOSPADM

## 2019-01-01 RX ORDER — FAMOTIDINE 10 MG/ML
20 INJECTION, SOLUTION INTRAVENOUS EVERY 12 HOURS SCHEDULED
Status: DISCONTINUED | OUTPATIENT
Start: 2019-01-01 | End: 2019-01-01

## 2019-01-01 RX ADMIN — CEFOXITIN 2 G: 10 INJECTION, POWDER, FOR SOLUTION INTRAVENOUS at 14:27

## 2019-01-01 RX ADMIN — PANTOPRAZOLE SODIUM 40 MG: 40 INJECTION, POWDER, FOR SOLUTION INTRAVENOUS at 23:35

## 2019-01-01 RX ADMIN — SODIUM CHLORIDE, PRESERVATIVE FREE 3 ML: 5 INJECTION INTRAVENOUS at 08:50

## 2019-01-01 RX ADMIN — SODIUM CHLORIDE 75 ML/HR: 9 INJECTION, SOLUTION INTRAVENOUS at 04:33

## 2019-01-01 RX ADMIN — ONDANSETRON 4 MG: 2 INJECTION INTRAMUSCULAR; INTRAVENOUS at 08:39

## 2019-01-01 RX ADMIN — FAMOTIDINE 20 MG: 20 TABLET, FILM COATED ORAL at 09:47

## 2019-01-01 RX ADMIN — PRAVASTATIN SODIUM 40 MG: 20 TABLET ORAL at 08:47

## 2019-01-01 RX ADMIN — FAMOTIDINE 20 MG: 20 TABLET, FILM COATED ORAL at 21:25

## 2019-01-01 RX ADMIN — FAMOTIDINE 20 MG: 10 INJECTION, SOLUTION INTRAVENOUS at 07:50

## 2019-01-01 RX ADMIN — FAMOTIDINE 20 MG: 20 TABLET, FILM COATED ORAL at 21:46

## 2019-01-01 RX ADMIN — CEFOXITIN 2 G: 10 INJECTION, POWDER, FOR SOLUTION INTRAVENOUS at 13:01

## 2019-01-01 RX ADMIN — LIDOCAINE HYDROCHLORIDE 100 MG: 20 INJECTION, SOLUTION INFILTRATION; PERINEURAL at 08:11

## 2019-01-01 RX ADMIN — CEFDINIR 300 MG: 300 CAPSULE ORAL at 15:23

## 2019-01-01 RX ADMIN — SODIUM CHLORIDE 125 ML/HR: 9 INJECTION, SOLUTION INTRAVENOUS at 05:14

## 2019-01-01 RX ADMIN — FAMOTIDINE 20 MG: 10 INJECTION INTRAVENOUS at 20:29

## 2019-01-01 RX ADMIN — PROPOFOL 20 MG: 10 INJECTION, EMULSION INTRAVENOUS at 08:34

## 2019-01-01 RX ADMIN — SODIUM CHLORIDE, PRESERVATIVE FREE 3 ML: 5 INJECTION INTRAVENOUS at 17:21

## 2019-01-01 RX ADMIN — SODIUM CHLORIDE, PRESERVATIVE FREE 3 ML: 5 INJECTION INTRAVENOUS at 08:36

## 2019-01-01 RX ADMIN — CEFDINIR 300 MG: 300 CAPSULE ORAL at 20:21

## 2019-01-01 RX ADMIN — SODIUM CHLORIDE, PRESERVATIVE FREE 3 ML: 5 INJECTION INTRAVENOUS at 20:25

## 2019-01-01 RX ADMIN — SODIUM CHLORIDE, PRESERVATIVE FREE 3 ML: 5 INJECTION INTRAVENOUS at 23:35

## 2019-01-01 RX ADMIN — IRON SUCROSE 300 MG: 20 INJECTION, SOLUTION INTRAVENOUS at 18:52

## 2019-01-01 RX ADMIN — SODIUM CHLORIDE, PRESERVATIVE FREE 3 ML: 5 INJECTION INTRAVENOUS at 20:07

## 2019-01-01 RX ADMIN — SODIUM CHLORIDE, PRESERVATIVE FREE 3 ML: 5 INJECTION INTRAVENOUS at 08:48

## 2019-01-01 RX ADMIN — SODIUM CHLORIDE, PRESERVATIVE FREE 3 ML: 5 INJECTION INTRAVENOUS at 21:00

## 2019-01-01 RX ADMIN — GUAIFENESIN 600 MG: 600 TABLET, EXTENDED RELEASE ORAL at 20:45

## 2019-01-01 RX ADMIN — FAMOTIDINE 20 MG: 20 TABLET, FILM COATED ORAL at 20:45

## 2019-01-01 RX ADMIN — FAMOTIDINE 20 MG: 20 TABLET, FILM COATED ORAL at 08:47

## 2019-01-01 RX ADMIN — CEFOXITIN 2 G: 10 INJECTION, POWDER, FOR SOLUTION INTRAVENOUS at 05:25

## 2019-01-01 RX ADMIN — FAMOTIDINE 20 MG: 10 INJECTION INTRAVENOUS at 20:31

## 2019-01-01 RX ADMIN — PROPOFOL 20 MG: 10 INJECTION, EMULSION INTRAVENOUS at 08:31

## 2019-01-01 RX ADMIN — SODIUM CHLORIDE, PRESERVATIVE FREE 3 ML: 5 INJECTION INTRAVENOUS at 09:47

## 2019-01-01 RX ADMIN — CEFOXITIN 2 G: 10 INJECTION, POWDER, FOR SOLUTION INTRAVENOUS at 06:14

## 2019-01-01 RX ADMIN — DEXAMETHASONE SODIUM PHOSPHATE 8 MG: 4 INJECTION INTRA-ARTICULAR; INTRALESIONAL; INTRAMUSCULAR; INTRAVENOUS; SOFT TISSUE at 08:18

## 2019-01-01 RX ADMIN — OXYCODONE HYDROCHLORIDE AND ACETAMINOPHEN 1 TABLET: 7.5; 325 TABLET ORAL at 05:16

## 2019-01-01 RX ADMIN — HYDROCODONE BITARTRATE AND ACETAMINOPHEN 1 TABLET: 7.5; 325 TABLET ORAL at 18:25

## 2019-01-01 RX ADMIN — SODIUM CHLORIDE, PRESERVATIVE FREE 3 ML: 5 INJECTION INTRAVENOUS at 10:05

## 2019-01-01 RX ADMIN — PRAVASTATIN SODIUM 40 MG: 20 TABLET ORAL at 08:36

## 2019-01-01 RX ADMIN — CEFOXITIN 2 G: 10 INJECTION, POWDER, FOR SOLUTION INTRAVENOUS at 05:14

## 2019-01-01 RX ADMIN — PRAVASTATIN SODIUM 40 MG: 20 TABLET ORAL at 08:52

## 2019-01-01 RX ADMIN — FENTANYL CITRATE 50 MCG: 50 INJECTION INTRAMUSCULAR; INTRAVENOUS at 08:26

## 2019-01-01 RX ADMIN — HYDROCODONE BITARTRATE AND ACETAMINOPHEN 1 TABLET: 7.5; 325 TABLET ORAL at 11:53

## 2019-01-01 RX ADMIN — CEFDINIR 300 MG: 300 CAPSULE ORAL at 09:47

## 2019-01-01 RX ADMIN — LIDOCAINE HYDROCHLORIDE 10 ML: 10 INJECTION, SOLUTION INFILTRATION; PERINEURAL at 13:40

## 2019-01-01 RX ADMIN — SODIUM CHLORIDE, PRESERVATIVE FREE 3 ML: 5 INJECTION INTRAVENOUS at 20:45

## 2019-01-01 RX ADMIN — SODIUM CHLORIDE 125 ML/HR: 9 INJECTION, SOLUTION INTRAVENOUS at 14:08

## 2019-01-01 RX ADMIN — CEFOXITIN 2 G: 10 INJECTION, POWDER, FOR SOLUTION INTRAVENOUS at 22:56

## 2019-01-01 RX ADMIN — FENTANYL CITRATE 50 MCG: 50 INJECTION INTRAMUSCULAR; INTRAVENOUS at 08:06

## 2019-01-01 RX ADMIN — FAMOTIDINE 20 MG: 10 INJECTION INTRAVENOUS at 22:03

## 2019-01-01 RX ADMIN — OXYCODONE HYDROCHLORIDE AND ACETAMINOPHEN 1 TABLET: 7.5; 325 TABLET ORAL at 18:50

## 2019-01-01 RX ADMIN — SODIUM CHLORIDE, PRESERVATIVE FREE 3 ML: 5 INJECTION INTRAVENOUS at 08:53

## 2019-01-01 RX ADMIN — CEFOXITIN 2 G: 10 INJECTION, POWDER, FOR SOLUTION INTRAVENOUS at 22:21

## 2019-01-01 RX ADMIN — CEFOXITIN 2 G: 10 INJECTION, POWDER, FOR SOLUTION INTRAVENOUS at 05:00

## 2019-01-01 RX ADMIN — CEFDINIR 300 MG: 300 CAPSULE ORAL at 20:45

## 2019-01-01 RX ADMIN — PROPOFOL 20 MG: 10 INJECTION, EMULSION INTRAVENOUS at 08:37

## 2019-01-01 RX ADMIN — SODIUM CHLORIDE, PRESERVATIVE FREE 3 ML: 5 INJECTION INTRAVENOUS at 20:21

## 2019-01-01 RX ADMIN — OXYCODONE HYDROCHLORIDE AND ACETAMINOPHEN 1 TABLET: 7.5; 325 TABLET ORAL at 09:06

## 2019-01-01 RX ADMIN — SODIUM CHLORIDE, POTASSIUM CHLORIDE, SODIUM LACTATE AND CALCIUM CHLORIDE 9 ML/HR: 600; 310; 30; 20 INJECTION, SOLUTION INTRAVENOUS at 07:50

## 2019-01-01 RX ADMIN — FAMOTIDINE 20 MG: 10 INJECTION INTRAVENOUS at 08:59

## 2019-01-01 RX ADMIN — FAMOTIDINE 20 MG: 20 TABLET, FILM COATED ORAL at 20:21

## 2019-01-01 RX ADMIN — PRAVASTATIN SODIUM 40 MG: 20 TABLET ORAL at 10:05

## 2019-01-01 RX ADMIN — PRAVASTATIN SODIUM 40 MG: 20 TABLET ORAL at 08:50

## 2019-01-01 RX ADMIN — CEFOXITIN 2 G: 10 INJECTION, POWDER, FOR SOLUTION INTRAVENOUS at 05:16

## 2019-01-01 RX ADMIN — OXYCODONE HYDROCHLORIDE AND ACETAMINOPHEN 1 TABLET: 7.5; 325 TABLET ORAL at 22:58

## 2019-01-01 RX ADMIN — IOPAMIDOL 25 ML: 612 INJECTION, SOLUTION INTRAVENOUS at 14:20

## 2019-01-01 RX ADMIN — FAMOTIDINE 20 MG: 10 INJECTION INTRAVENOUS at 08:51

## 2019-01-01 RX ADMIN — PRAVASTATIN SODIUM 40 MG: 20 TABLET ORAL at 08:59

## 2019-01-01 RX ADMIN — HYDROCODONE BITARTRATE AND ACETAMINOPHEN 1 TABLET: 7.5; 325 TABLET ORAL at 20:48

## 2019-01-01 RX ADMIN — HYDROMORPHONE HYDROCHLORIDE 0.5 MG: 2 INJECTION INTRAMUSCULAR; INTRAVENOUS; SUBCUTANEOUS at 08:37

## 2019-01-01 RX ADMIN — HYDROMORPHONE HYDROCHLORIDE 0.5 MG: 2 INJECTION INTRAMUSCULAR; INTRAVENOUS; SUBCUTANEOUS at 08:33

## 2019-01-01 RX ADMIN — HYDROCODONE BITARTRATE AND ACETAMINOPHEN 1 TABLET: 7.5; 325 TABLET ORAL at 17:10

## 2019-01-01 RX ADMIN — PRAVASTATIN SODIUM 40 MG: 20 TABLET ORAL at 09:46

## 2019-01-01 RX ADMIN — CEFOXITIN 2 G: 10 INJECTION, POWDER, FOR SOLUTION INTRAVENOUS at 21:38

## 2019-01-01 RX ADMIN — CEFOXITIN 2 G: 10 INJECTION, POWDER, FOR SOLUTION INTRAVENOUS at 13:06

## 2019-01-01 RX ADMIN — CEFOXITIN 2 G: 10 INJECTION, POWDER, FOR SOLUTION INTRAVENOUS at 22:03

## 2019-01-01 RX ADMIN — HYDROCODONE BITARTRATE AND ACETAMINOPHEN 1 TABLET: 7.5; 325 TABLET ORAL at 23:12

## 2019-01-01 RX ADMIN — CEFOXITIN 2 G: 10 INJECTION, POWDER, FOR SOLUTION INTRAVENOUS at 14:08

## 2019-01-01 RX ADMIN — CEFOXITIN 2 G: 10 INJECTION, POWDER, FOR SOLUTION INTRAVENOUS at 21:25

## 2019-01-01 RX ADMIN — SUCCINYLCHOLINE CHLORIDE 140 MG: 20 INJECTION, SOLUTION INTRAMUSCULAR; INTRAVENOUS; PARENTERAL at 08:11

## 2019-01-01 RX ADMIN — CEFOXITIN 2 G: 10 INJECTION, POWDER, FOR SOLUTION INTRAVENOUS at 14:22

## 2019-01-01 RX ADMIN — SODIUM CHLORIDE 125 ML/HR: 9 INJECTION, SOLUTION INTRAVENOUS at 20:50

## 2019-01-01 RX ADMIN — PROPOFOL 120 MG: 10 INJECTION, EMULSION INTRAVENOUS at 08:11

## 2019-01-01 RX ADMIN — FAMOTIDINE 20 MG: 20 TABLET, FILM COATED ORAL at 08:36

## 2019-01-01 RX ADMIN — OXYCODONE HYDROCHLORIDE AND ACETAMINOPHEN 1 TABLET: 7.5; 325 TABLET ORAL at 19:53

## 2019-01-01 RX ADMIN — HYDROCODONE BITARTRATE AND ACETAMINOPHEN 1 TABLET: 7.5; 325 TABLET ORAL at 17:52

## 2019-01-01 RX ADMIN — CEFOXITIN 2 G: 10 INJECTION, POWDER, FOR SOLUTION INTRAVENOUS at 21:46

## 2019-01-01 RX ADMIN — CEFOXITIN 2 G: 10 INJECTION, POWDER, FOR SOLUTION INTRAVENOUS at 06:19

## 2019-01-01 RX ADMIN — SODIUM CHLORIDE, PRESERVATIVE FREE 3 ML: 5 INJECTION INTRAVENOUS at 21:30

## 2019-01-01 RX ADMIN — OXYCODONE HYDROCHLORIDE AND ACETAMINOPHEN 1 TABLET: 7.5; 325 TABLET ORAL at 04:59

## 2019-01-01 RX ADMIN — OXYCODONE HYDROCHLORIDE AND ACETAMINOPHEN 1 TABLET: 7.5; 325 TABLET ORAL at 03:51

## 2019-01-01 RX ADMIN — PROPOFOL 20 MG: 10 INJECTION, EMULSION INTRAVENOUS at 08:28

## 2019-01-01 RX ADMIN — PANTOPRAZOLE SODIUM 40 MG: 40 TABLET, DELAYED RELEASE ORAL at 08:52

## 2019-01-01 RX ADMIN — FAMOTIDINE 20 MG: 20 TABLET, FILM COATED ORAL at 10:05

## 2019-01-01 RX ADMIN — GUAIFENESIN 600 MG: 600 TABLET, EXTENDED RELEASE ORAL at 08:47

## 2019-01-01 RX ADMIN — FAMOTIDINE 20 MG: 10 INJECTION INTRAVENOUS at 12:01

## 2019-01-01 RX ADMIN — SODIUM CHLORIDE, PRESERVATIVE FREE 3 ML: 5 INJECTION INTRAVENOUS at 08:59

## 2019-04-30 PROBLEM — K29.80 DUODENITIS: Status: ACTIVE | Noted: 2019-01-01

## 2019-04-30 PROBLEM — D64.9 ANEMIA: Status: ACTIVE | Noted: 2019-01-01

## 2019-04-30 PROBLEM — R63.4 WEIGHT LOSS: Status: ACTIVE | Noted: 2019-01-01

## 2019-04-30 PROBLEM — N32.89 BLADDER MASS: Status: ACTIVE | Noted: 2019-01-01

## 2019-04-30 PROBLEM — N13.30 HYDRONEPHROSIS OF LEFT KIDNEY: Status: ACTIVE | Noted: 2019-01-01

## 2019-04-30 PROBLEM — J69.0 ASPIRATION PNEUMONIA DUE TO VOMITUS (HCC): Status: ACTIVE | Noted: 2019-01-01

## 2019-05-01 PROBLEM — D50.0 IRON DEFICIENCY ANEMIA DUE TO CHRONIC BLOOD LOSS: Status: ACTIVE | Noted: 2019-01-01

## 2019-05-01 PROBLEM — R59.0 MEDIASTINAL LYMPHADENOPATHY: Status: ACTIVE | Noted: 2019-01-01

## 2019-05-01 PROBLEM — N32.89 BLADDER MASS: Status: ACTIVE | Noted: 2019-01-01

## 2019-05-01 NOTE — PLAN OF CARE
"Problem: Patient Care Overview  Goal: Plan of Care Review   05/01/19 1412   Coping/Psychosocial   Plan of Care Reviewed With patient   Plan of Care Review   Progress improving   OTHER   Outcome Summary Pt seen for bedside swallow. Wife reports voice change, no complaints of dysphagia. OME revealed reduced intensity of voice. Pt repaired his own dentures, therefore the top dentures have an excess amount of \"glue\" per patient. No s/s of asp with ice. Consistent voice change with thins via spoon, cup, and straw. Pt cleared voice with throat clear/re swallow when cued. No overt s/s of asp with nectar via cup and straw. Adequate AP transit with puree. Rotary mastication pattern with mech soft and regular. Wet voice appreciated with mixed. No oral residue post swallow. SLP recs regular and nectar, no mixed, straws ok. Allow free water protocol with ice 30 mins after PO after oral care. Will complete VFSS next date to further assess.          "

## 2019-05-01 NOTE — PROGRESS NOTES
Discharge Planning Assessment  [facility]     Patient Name: Modesto Diego  MRN: [id number]  Today's Date: 5/1/2019    Admit Date: 4/30/2019    Discharge Needs Assessment     Row Name 05/01/19 1244       Living Environment    Lives With  spouse    Name(s) of Who Lives With Patient  wife, Lona Diego, [phone]    Current Living Arrangements  home/apartment/condo    Primary Care Provided by  self    Provides Primary Care For  no one    Family Caregiver if Needed  spouse    Quality of Family Relationships  helpful;involved;supportive    Able to Return to Prior Arrangements  yes       Resource/Environmental Concerns    Resource/Environmental Concerns  none       Transition Planning    Patient/Family Anticipates Transition to  home with family    Transportation Anticipated  family or friend will provide       Discharge Needs Assessment    Concerns to be Addressed  discharge planning    Equipment Currently Used at Home  walker, rolling;cane, straight    Discharge Coordination/Progress  Home with wife, follow for needs        Discharge Plan     Row Name 05/01/19 1598       Plan    Plan  Home with wife, follow for needs    Patient/Family in Agreement with Plan  yes    Plan Comments  IMM letter checked. CCP met with pt and wife (Lona Diego, [phone]) to discuss d/c planning. Facesheet verified. CCP role explained. Pt resides with his wife in a two level home with basement steps, and uses cane or walker for mobility. Pt denies past home health or past sub-acute rehab. Pt confirms [facility] is [facility] [address]. Pt uncertain of needs pending treatment course. CCP to follow to assist should d/c needs arise. Anna Medellin [facility]        Destination      No service coordination in this encounter.      Durable Medical Equipment      No service coordination in this encounter.      Dialysis/Infusion      No service coordination in this encounter.      Home Medical Care      No service coordination in this encounter.       Therapy      No service coordination in this encounter.      Community Resources      No service coordination in this encounter.          Demographic Summary     Row Name 05/01/19 1243       General Information    Admission Type  inpatient    Arrived From  home    Required Notices Provided  Important Message from Medicare    Referral Source  admission list    Reason for Consult  discharge planning    Preferred Language  English        Functional Status     Row Name 05/01/19 1243       Functional Status    Usual Activity Tolerance  good    Current Activity Tolerance  good       Functional Status, IADL    Medications  independent    Meal Preparation  independent    Housekeeping  independent    Laundry  independent    Shopping  independent       Mental Status Summary    Recent Changes in Mental Status/Cognitive Functioning  no changes        Psychosocial    No documentation.       Abuse/Neglect    No documentation.       Legal    No documentation.       Substance Abuse    No documentation.       Patient Forms    No documentation.           Maryjane Medellin LCSW

## 2019-05-01 NOTE — CONSULTS
[facility]  Initial Inpatient Consult Note    Referring Provider: Dr. HESHAM Moody    Reason for Consultation: duodenitis, anemia and weight loss    Subjective     History of present illness:    78 y.o. male who was admitted 2019 directly by Dr. Mg Root because of an abnormal CAT scan of the abdomen as a work-up for weight loss.  Patient states that he has been getting weaker over the last few months.  He is lost 40 pounds in the last year.  He does have a poor appetite.  He has no nausea or vomiting.  He has no abdominal pain or chest pain.  He has occasional diarrhea.  No constipation.  No rectal bleeding or melena.  He does pass blood from his penis.  He is never had an EGD or a colonoscopy.  Part of the problem is that his brother  from puncture during a colonoscopy.  The patient does take one baby aspirin a day.  He also takes omeprazole daily.  He lives in [place] with his wife of 55 years.  He has 2 children.  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  He has had a cough for the last 1 to 2 weeks.  The patient did have a CT of the abdomen and pelvis without IV contrast yesterday that showed:  IMPRESSION:  1.  Findings of extensive duodenitis involving the first and second  portions of the duodenum. No free intraperitoneal air is visualized.  2.  Large soft tissue density mass centered within the right aspect of  the bladder with upstream severe right hydroureteronephrosis. Findings  are most concerning for bladder malignancy and further evaluation with  cystoscopy is recommended. There is an enlarged right pelvic sidewall  lymph node highly concerning for metastatic disease.  3.  Findings of bibasilar pneumonia, most suggestive of aspiration as  etiology.     The above findings were discussed with Dr. Root by telephone by Sy Strange at 4:55 PM on   2019 .     A urologist is supposed to see the patient.  The patient is on IV antibiotics for  pneumonia.  The patient has never had intestinal bleeding.  The patient has been a non-smoker for 40 years.  He denies alcohol use.    Past Medical History:  History reviewed. No pertinent past medical history.  Past Surgical History:  History reviewed. No pertinent surgical history.   Social History:   Social History     Tobacco Use   • Smoking status: Former Smoker     Packs/day: 0.50     Years: 22.00     Pack years: 11.00     Types: Cigarettes     Last attempt to quit: 1979     Years since quittin.3   • Smokeless tobacco: Never Used   Substance Use Topics   • Alcohol use: No     Frequency: Never      Family History:  History reviewed. No pertinent family history.    Home Meds:  Medications Prior to Admission   Medication Sig Dispense Refill Last Dose   • pravastatin (PRAVACHOL) 40 MG tablet Take 40 mg by mouth Daily.   2019 at Unknown time     Current Meds:     cefOXitin 2 g Intravenous Q8H   insulin lispro 0-9 Units Subcutaneous 4x Daily With Meals & Nightly   pantoprazole 40 mg Intravenous Q12H   pravastatin 40 mg Oral Daily   sodium chloride 3 mL Intravenous Q12H     Allergies:  Allergies   Allergen Reactions   • Amoxicillin Hives     Review of Systems  The following systems were reviewed and negative;  cardiovascular, musculoskeletal and neurological     Objective     Vital Signs  Temp:  [97.9 °F (36.6 °C)-99.1 °F (37.3 °C)] 97.9 °F (36.6 °C)  Heart Rate:  [] 88  Resp:  [16-20] 16  BP: (122-147)/(64-86) 122/64  Physical Exam:  General Appearance:    Alert, cooperative, in no acute distress   Head:    Normocephalic, without obvious abnormality, atraumatic   Eyes:            Lids and lashes normal, conjunctivae and sclerae normal, no   icterus   Throat:   No oral lesions, no thrush, oral mucosa moist   Neck:   No adenopathy, supple, trachea midline, no thyromegaly, no   carotid bruit, no JVD   Lungs:     Clear to auscultation,respirations regular, even and                   unlabored     Heart:    Regular rhythm and normal rate, normal S1 and S2, no            murmur, no gallop, no rub, no click   Chest Wall:    No abnormalities observed   Abdomen:     Normal bowel sounds, no masses, no organomegaly, soft        non-tender, non-distended, no guarding, no rebound                 tenderness   Rectal:     Deferred   Extremities:   no edema, no cyanosis, no redness   Skin:   No bleeding, bruising or rash   Lymph nodes:   No palpable adenopathy   Psychiatric:  Judgement and insight: normal   Orientation to person place and time: normal   Mood and affect: normal   Results Review:   I reviewed the patient's new clinical results.    Results from last 7 days   Lab Units 05/01/19  0539 04/30/19 2024   WBC 10*3/mm3 12.68* 14.45*   HEMOGLOBIN g/dL 8.3* 8.6*   HEMATOCRIT % 26.6* 27.8*   PLATELETS 10*3/mm3 334 345     Results from last 7 days   Lab Units 05/01/19 0539 04/30/19 2024   SODIUM mmol/L 137 136   POTASSIUM mmol/L 4.0 4.2   CHLORIDE mmol/L 103 99   CO2 mmol/L 23.2 24.2   BUN mg/dL 30* 34*   CREATININE mg/dL 1.47* 1.60*   CALCIUM mg/dL 10.6* 11.2*   BILIRUBIN mg/dL 0.5  --    ALK PHOS U/L 70  --    ALT (SGPT) U/L 13  --    AST (SGOT) U/L 16  --    GLUCOSE mg/dL 116* 110*     Results from last 7 days   Lab Units 04/30/19 2024   INR  1.24*     No results found for: LIPASE    Radiology:  CT Head Without Contrast   Preliminary Result   1. There is mild small vessel disease in the cerebral white matter and   mild cerebral atrophy. There are calcified atherosclerotic plaques in   the intracranial segments of the distal vertebral arteries and cavernous   segments of the internal carotid arteries bilaterally.   2. Partial opacification of the ethmoid and maxillary sinuses   bilaterally and the left sphenoid sinus with fluid and mucosal   thickening.   3. The remainder of the head CT is normal.       Radiation dose reduction techniques were utilized, including automated   exposure control and exposure  modulation based on body size.              CT Chest Without Contrast    (Results Pending)       Assessment/Plan   Patient Active Problem List   Diagnosis   • Weight loss   • Aspiration pneumonia due to vomitus (CMS/HCC)   • Bladder mass   • Hydronephrosis of left kidney   • Duodenitis   • Anemia       I discussed the patients findings and my recommendations with patient.    Assessment:  1.  This 78-year-old gentleman has weight loss.  2.  The patient has an abnormal CAT scan of the abdomen and pelvis suggesting a bladder mass with possibly metastatic disease?.  3.  Also on the CAT scan of the abdomen and pelvis that shows that he has duodenitis.  He denies any nausea and vomiting but he is on one baby aspirin a day.  In addition he says he takes omeprazole daily.  4.  The CAT scan of the abdomen suggests that he has pneumonia.  5.  Patient has anemia that may be iron deficient?  6.  His brother  from a perforation during a colonoscopy.  Nonetheless the patient states that he would be willing to have an EGD and colonoscopy if if needed.    Recommendations:  1.  I will check stool Hemoccult.  2.  When okay with Dr. Moody and his pneumonia is more stable we could perform an EGD and colonoscopy.  3.  I agree with continuing Protonix 40 mg p.o. daily    Luke Tran MD

## 2019-05-01 NOTE — PLAN OF CARE
Problem: Nutrition, Imbalanced: Inadequate Oral Intake (Adult)  Intervention: Promote/Optimize Nutrition   05/01/19 1337   Nutrition Interventions   Oral Nutrition Promotion nutritional therapy counseling provided

## 2019-05-01 NOTE — PLAN OF CARE
Problem: Patient Care Overview  Goal: Plan of Care Review  Outcome: Ongoing (interventions implemented as appropriate)   05/01/19 1836   Coping/Psychosocial   Plan of Care Reviewed With patient   Plan of Care Review   Progress improving   OTHER   Outcome Summary Pt ambulating with staff, complains of weakness, speech evaluated, ntl regular diet, gi and urology made recommendations, iron added, possilbe transfusion, will monitor.      Goal: Individualization and Mutuality  Outcome: Ongoing (interventions implemented as appropriate)      Problem: Pneumonia (Adult)  Goal: Signs and Symptoms of Listed Potential Problems Will be Absent, Minimized or Managed (Pneumonia)  Outcome: Ongoing (interventions implemented as appropriate)   05/01/19 1836   Goal/Outcome Evaluation   Problems Assessed (Pneumonia) all   Problems Present (Pneumonia) none       Problem: Urine Elimination Impaired (Adult)  Goal: Identify Related Risk Factors and Signs and Symptoms  Outcome: Ongoing (interventions implemented as appropriate)   05/01/19 1836   Urine Elimination Impaired (Adult)   Related Risk Factors (Urine Elimination Impaired) urological disorder   Signs and Symptoms (Urine Elimination Impaired) impaired renal function     Goal: Effective or Improved Urinary Elimination  Outcome: Ongoing (interventions implemented as appropriate)   05/01/19 1836   Urine Elimination Impaired (Adult)   Effective or Improved Urinary Elimination making progress toward outcome     Goal: Effective Containment of Urine  Outcome: Ongoing (interventions implemented as appropriate)   05/01/19 1836   Urine Elimination Impaired (Adult)   Effective Containment of Urine making progress toward outcome     Goal: Reduced Incontinence Episodes  Outcome: Ongoing (interventions implemented as appropriate)   05/01/19 1836   Urine Elimination Impaired (Adult)   Reduced Incontinence Episodes making progress toward outcome       Problem: Fall Risk (Adult)  Goal: Identify  Related Risk Factors and Signs and Symptoms  Outcome: Ongoing (interventions implemented as appropriate)   05/01/19 1836   Fall Risk (Adult)   Related Risk Factors (Fall Risk) gait/mobility problems   Signs and Symptoms (Fall Risk) presence of risk factors     Goal: Absence of Fall  Outcome: Ongoing (interventions implemented as appropriate)   05/01/19 1836   Fall Risk (Adult)   Absence of Fall making progress toward outcome       Problem: Skin Injury Risk (Adult)  Goal: Identify Related Risk Factors and Signs and Symptoms  Outcome: Ongoing (interventions implemented as appropriate)   05/01/19 1836   Skin Injury Risk (Adult)   Related Risk Factors (Skin Injury Risk) body weight extremes     Goal: Skin Health and Integrity  Outcome: Ongoing (interventions implemented as appropriate)   05/01/19 1836   Skin Injury Risk (Adult)   Skin Health and Integrity making progress toward outcome       Problem: Nutrition, Imbalanced: Inadequate Oral Intake (Adult)  Goal: Identify Related Risk Factors and Signs and Symptoms  Outcome: Ongoing (interventions implemented as appropriate)   05/01/19 1836   Nutrition, Imbalanced: Inadequate Oral Intake (Adult)   Related Risk Factors (Nutrition Imbalance, Inadequate Oral Intake) appetite decreased   Signs and Symptoms (Nutrition Imbalance, Inadequate Oral Intake: Signs and Symptoms) weight decreased (percent weight loss, percent usual body weight, body mass index less than 18.5) (Adults)     Goal: Improved Oral Intake  Outcome: Ongoing (interventions implemented as appropriate)   05/01/19 1836   Nutrition, Imbalanced: Inadequate Oral Intake (Adult)   Improved Oral Intake making progress toward outcome     Goal: Prevent Further Weight Loss  Outcome: Ongoing (interventions implemented as appropriate)   05/01/19 1836   Nutrition, Imbalanced: Inadequate Oral Intake (Adult)   Prevent Further Weight Loss making progress toward outcome

## 2019-05-01 NOTE — PLAN OF CARE
Problem: Patient Care Overview  Goal: Plan of Care Review  Outcome: Ongoing (interventions implemented as appropriate)   05/01/19 3110   Coping/Psychosocial   Plan of Care Reviewed With patient;spouse   Plan of Care Review   Progress no change   OTHER   Outcome Summary arrived yesterday at [facility], dx acute weight loss and SOA/pneumonia. disoriented to time i.e. knows the president but thought the month was March. poor historian, unable to answer when his symptoms started and why he has lost so much weight. CT of head ordered to be done to rule out any organic reason for confusion. + sepsis screen, WBC elevated, NS @125/hr, IV abx started, + UTI awaiting culture, blood cultures obtained, urology consulted, GI consulted, both to see in AM, pt does not know when last BM was, BS are hypoactive x4Q. on RA, will cont to monitor

## 2019-05-01 NOTE — PLAN OF CARE
Problem: Patient Care Overview  Goal: Plan of Care Review   05/01/19 0451   Coping/Psychosocial   Plan of Care Reviewed With patient   OTHER   Outcome Summary Pt is an 79 yo male who presents from home w/progressive weakness, weight loss, hematuria, and bladder mass. Upon exam, pt demonstrates generalized weakness, impaired balance, and decreased endurance limiting mobility. Pt was independent w/o AD until recently; reports he has been having increased difficulty with mobility for the past week. Pt currently requires CGA and use of walker for safety and would benefit from continued PT to address impairments, improve safety, and increase independence with functional mobility. Will progress to least restrictive AD as tolerated.

## 2019-05-01 NOTE — PROGRESS NOTES
"    DAILY PROGRESS NOTE  [facility]    Patient Identification:  Name: Modesto Diego  Age: 78 y.o.  Sex: male  :  1940  MRN: [id number]         Primary Care Physician: Mg Root MD    Subjective:  Interval History: Feeling better today and denies any altered mentation nausea or vomiting.  Spouse at bedside to help with some of the history as the patient is pretty quiet otherwise.  Apparently he has been dealing with a cough for quite some time.  They deny any overt problems swallowing especially when taking and thin liquids.  He has been dealing with diarrhea they claim for months and months.  He states that his stools are not black and is noticed no blood in his stool.  His fatigue is gotten extremely worse and he is unable to carry out activities of daily living.  There is been no issues with syncope or focal loss of function.  Denies any current chest pain and also denies any current troubles breathing as his oxygen saturations are adequate on room air    Objective:    Scheduled Meds:    cefOXitin 2 g Intravenous Q8H   clindamycin 300 mg Intravenous Q8H   famotidine 20 mg Intravenous Q12H   insulin lispro 0-9 Units Subcutaneous 4x Daily With Meals & Nightly   iron sucrose 300 mg Intravenous Once   pravastatin 40 mg Oral Daily   sodium chloride 3 mL Intravenous Q12H     Continuous Infusions:    sodium chloride 75 mL/hr Last Rate: 125 mL/hr (19 1408)       Vital signs in last 24 hours:  Temp:  [97.6 °F (36.4 °C)-99.1 °F (37.3 °C)] 97.6 °F (36.4 °C)  Heart Rate:  [] 88  Resp:  [16-20] 18  BP: (111-147)/(63-86) 111/63    Intake/Output:    Intake/Output Summary (Last 24 hours) at 2019 1601  Last data filed at 2019 1544  Gross per 24 hour   Intake --   Output 875 ml   Net -875 ml       Exam:  /63 (BP Location: Left arm, Patient Position: Lying)   Pulse 88   Temp 97.6 °F (36.4 °C) (Oral)   Resp 18   Ht 177.8 cm (70\")   Wt 72.1 kg (159 lb)   SpO2 95%   BMI " 22.81 kg/m²     General Appearance:    Alert, cooperative, no distress, AAOx3, clinically looks much better than he does upon review of the chart as he does not appear overwhelmingly toxic, very soft-spoken                          Head:    Normocephalic, without obvious abnormality, atraumatic                           Eyes:    PERRL, conjunctiva/corneas clear, EOM's intact, both eyes                         Throat:   Lips, tongue, gums normal; oral mucosa pink and moist                           Neck:   Supple, symmetrical, trachea midline, no JVD                         Lungs:    Decreased bases with subtle rhonchi to auscultation bilaterally, respirations unlabored                 Chest Wall:    No tenderness or deformity                          Heart:    Regular rate and rhythm, S1 and S2 normal                  Abdomen:     Soft, non-tender, bowel sounds active, no masses, no                                                        organomegaly                  Extremities: Mostly moving all, no cyanosis or edema                        Pulses:   Pulses palpable in all extremities                            Skin:   Skin is warm and dry,  no rashes or palpable lesions                  Neurologic:   CNII-XII intact, globally weak without focal deficits noted     Data Review:  Labs in chart were reviewed.    Assessment:  Active Hospital Problems    Diagnosis  POA   • **Aspiration pneumonia due to vomitus (CMS/HCC) [J69.0]  Unknown   • Mediastinal lymphadenopathy [R59.0]  Unknown   • Iron deficiency anemia due to chronic blood loss [D50.0]  Unknown   • Weight loss [R63.4]  Unknown   • Bladder mass [N32.89]  Unknown   • Bladder mass [N32.89]  Unknown   • Duodenitis [K29.80]  Unknown   • Anemia [D64.9]  Unknown      Resolved Hospital Problems   No resolved problems to display.       Plan:  Aspiration pneumonia   -CT of the chest reviewed and concerning for aspiration etiology with resulting mediastinal lymphadenopathy  in which CT repeat is endorsed in 3 months   -D2 cefoxitin has improved leukocytosis and since we are currently covering a aspiration pneumonia    -Speech has on NTL but I feel etiology is likely going to be due to a lower esophageal aspect as I anticipate he is likely to have ulceration with gastritis and reflux esophagitis once GI is able to perform EGD    Bladder mass concerning for cancer with metastasis to surrounding lymph node   -This is a very bad finding and I discussed the case with urologist Dr. Harris who will proceed with cystoscopy and biopsy at that juncture    Probable GI bleed due to chronic GI losses as given above severely depressed iron levels   -Stool for heme pending   -IV Chava for tonight   -Repeat CBC in a.m. and anticipate to transfuse if hemoglobin drops below 8   -GI to proceed with upper and lower endoscopy -medically cleared   -Duodenitis noted on CT   -PO Protonix -changed to IV Pepcid    Generalized weakness, anorexia, weight loss all likely secondary to underlying carcinoma which is the concern at this point    Anemia is multifactorial secondary to probable underlying malignancy as well as iron deficiency and chronic blood loss    Reported DM 2 with an A1c of only 5.2 -low-dose sliding scale for now    Hold statin for now    Greater than 30 minutes have been spent today counseling patient at bedside as well as coordinating care.  We actively discussed CODE STATUS as well with patient spouse present.  At this juncture he wishes to remain full code.         Tyrese Moody MD  5/1/2019  4:01 PM

## 2019-05-01 NOTE — NURSING NOTE
Pt straining to void and UO decreased overnight. Urine is dark, cloudy, tea coloured. Bladder scan post void showed 357cc in bladder. Int straight cath done, 200cc red/orange very cloudy urine with clots and sediment returned. Urology to see this AM, will cont to monitor

## 2019-05-01 NOTE — CONSULTS
FIRST UROLOGY CONSULT      Patient Identification:  NAME:  Modesto Diego  Age:  78 y.o.   Sex:  male   :  1940   MRN:  [id number]       Chief complaint: Hematuria    History of present illness: This is a 78-year-old gentleman who is been having some intermittent hematuria for several weeks now.  He is not told no one about this.  He is a previous smoker in the past pretty heavy but nothing recently.  He does have some obstructive urinary symptoms with a weak stream sometimes hesitancy and urge to go the bathroom.  He comes in with weight loss and now possible pneumonia from aspiration with vomiting.  Imaging shows a large bladder mass with significant right hydronephrosis suggestive of invasive bladder cancer.  He has had no prior urologic intervention.      Past medical history:  History reviewed. No pertinent past medical history.    Past surgical history:  History reviewed. No pertinent surgical history.    Allergies:  Amoxicillin    Home medications:  Medications Prior to Admission   Medication Sig Dispense Refill Last Dose   • pravastatin (PRAVACHOL) 40 MG tablet Take 40 mg by mouth Daily.   2019 at Unknown time        Hospital medications:    cefOXitin 2 g Intravenous Q8H   insulin lispro 0-9 Units Subcutaneous 4x Daily With Meals & Nightly   pantoprazole 40 mg Oral Q AM   pravastatin 40 mg Oral Daily   sodium chloride 3 mL Intravenous Q12H       sodium chloride 125 mL/hr Last Rate: 125 mL/hr (19 0514)     •  acetaminophen  •  acetaminophen  •  dextrose  •  dextrose  •  glucagon (human recombinant)  •  nitroglycerin  •  ondansetron  •  ondansetron  •  sodium chloride    Family history:  History reviewed. No pertinent family history.    Social history:  Social History     Tobacco Use   • Smoking status: Former Smoker     Packs/day: 0.50     Years: 22.00     Pack years: 11.00     Types: Cigarettes     Last attempt to quit: 1979     Years since quittin.3   • Smokeless tobacco:  Never Used   Substance Use Topics   • Alcohol use: No     Frequency: Never   • Drug use: No       Review of systems:    Negative 12-system ROS except for the following: Shortness of breath some degree of chest pain with inspiration.  Obstructive and irritable urinary symptoms as discussed above.  Vomiting but nothing at this point.  Weight loss as discussed above.      Objective:  TMax 24 hours:   Temp (24hrs), Av.2 °F (36.8 °C), Min:97.9 °F (36.6 °C), Max:99.1 °F (37.3 °C)      Vitals Ranges:   Temp:  [97.9 °F (36.6 °C)-99.1 °F (37.3 °C)] 97.9 °F (36.6 °C)  Heart Rate:  [] 88  Resp:  [16-20] 16  BP: (122-147)/(64-86) 122/64    Intake/Output Last 3 shifts:  I/O last 3 completed shifts:  In: -   Out: 475 [Urine:475]     Physical Exam:       General Appearance:    Alert, cooperative, in no acute distress   Head:    Normocephalic, without obvious abnormality, atraumatic   Eyes:          PERRL, conjunctivae and corneas clear   Ears:    Normal external inspection   Throat:   No oral lesions, oral mucosa moist   Neck:   Supple, no LAD, trachea midline   Back:     No CVA tenderness   Lungs:     Respirations unlabored, symmetric excursion    Heart:    RRR, intact peripheral pulses   Abdomen:     Soft, NDNT, no masses, no guarding   :    Testes descended bilaterally, no nodules, no mass. Epididymi normal.  Penis normal.     No scrotal or penile rashes noted.   EMILIANO:  Extremities:  Deferred.  No edema, no deformity   Skin:   No bleeding, bruising or rashes   Neuro/Psych:   Orientation intact, mood/affect pleasant, no focal findings       Results review:   I reviewed the patient's new clinical results.    Data review:  Lab Results (last 24 hours)     Procedure Component Value Units Date/Time    Urine Culture - Urine, Urine, Clean Catch [[id number]]  (Normal) Collected:  19    Specimen:  Urine, Clean Catch Updated:  19     Urine Culture Culture in progress    Hemoglobin A1c [[id number]]  (Normal)  Collected:  05/01/19 0539    Specimen:  Blood Updated:  05/01/19 0938     Hemoglobin A1C 5.20 %     Narrative:       Hemoglobin A1C Ranges:    Increased Risk for Diabetes  5.7% to 6.4%  Diabetes                     >= 6.5%  Diabetic Goal                < 7.0%    Comprehensive Metabolic Panel [[id number]]  (Abnormal) Collected:  05/01/19 0539    Specimen:  Blood Updated:  05/01/19 0623     Glucose 116 mg/dL      BUN 30 mg/dL      Creatinine 1.47 mg/dL      Sodium 137 mmol/L      Potassium 4.0 mmol/L      Chloride 103 mmol/L      CO2 23.2 mmol/L      Calcium 10.6 mg/dL      Total Protein 6.5 g/dL      Albumin 3.30 g/dL      ALT (SGPT) 13 U/L      AST (SGOT) 16 U/L      Alkaline Phosphatase 70 U/L      Total Bilirubin 0.5 mg/dL      eGFR Non African Amer 46 mL/min/1.73      Globulin 3.2 gm/dL      A/G Ratio 1.0 g/dL      BUN/Creatinine Ratio 20.4     Anion Gap 10.8 mmol/L     Narrative:       GFR Normal >60  Chronic Kidney Disease <60  Kidney Failure <15    CBC Auto Differential [[id number]]  (Abnormal) Collected:  05/01/19 0539    Specimen:  Blood Updated:  05/01/19 0600     WBC 12.68 10*3/mm3      RBC 3.14 10*6/mm3      Hemoglobin 8.3 g/dL      Hematocrit 26.6 %      MCV 84.7 fL      MCH 26.4 pg      MCHC 31.2 g/dL      RDW 14.9 %      RDW-SD 45.6 fl      MPV 9.0 fL      Platelets 334 10*3/mm3      Neutrophil % 89.7 %      Lymphocyte % 3.3 %      Monocyte % 6.1 %      Eosinophil % 0.1 %      Basophil % 0.2 %      Immature Grans % 0.6 %      Neutrophils, Absolute 11.38 10*3/mm3      Lymphocytes, Absolute 0.42 10*3/mm3      Monocytes, Absolute 0.77 10*3/mm3      Eosinophils, Absolute 0.01 10*3/mm3      Basophils, Absolute 0.02 10*3/mm3      Immature Grans, Absolute 0.08 10*3/mm3      nRBC 0.0 /100 WBC     Vitamin B12 [[id number]]  (Normal) Collected:  04/30/19 2233    Specimen:  Blood Updated:  04/30/19 2333     Vitamin B-12 554 pg/mL     Folate [[id number]]  (Normal) Collected:  04/30/19 2233    Specimen:  Blood Updated:   "04/30/19 2333     Folate 5.75 ng/mL     Ferritin [[id number]]  (Abnormal) Collected:  04/30/19 2024    Specimen:  Blood Updated:  04/30/19 2243     Ferritin 649.00 ng/mL     Iron [[id number]]  (Abnormal) Collected:  04/30/19 2024    Specimen:  Blood Updated:  04/30/19 2237     Iron 13 mcg/dL     Iron Profile [[id number]]  (Abnormal) Collected:  04/30/19 2024    Specimen:  Blood Updated:  04/30/19 2237     Iron 13 mcg/dL      Iron Saturation 6 %      Transferrin 139 mg/dL      TIBC 207 mcg/dL     Reticulocytes [[id number]]  (Abnormal) Collected:  04/30/19 2024    Specimen:  Blood Updated:  04/30/19 2236     Reticulocyte % 0.57 %      Reticulocyte Absolute 0.0184 10*6/mm3     Procalcitonin [[id number]]  (Normal) Collected:  04/30/19 2024    Specimen:  Blood Updated:  04/30/19 2214     Procalcitonin 0.19 ng/mL     Narrative:       As a Marker for Sepsis (Non-Neonates):   1. <0.5 ng/mL represents a low risk of severe sepsis and/or septic shock.  1. >2 ng/mL represents a high risk of severe sepsis and/or septic shock.    As a Marker for Lower Respiratory Tract Infections that require antibiotic therapy:  PCT on Admission     Antibiotic Therapy             6-12 Hrs later  > 0.5                Strongly Recommended            >0.25 - <0.5         Recommended  0.1 - 0.25           Discouraged                   Remeasure/reassess PCT  <0.1                 Strongly Discouraged          Remeasure/reassess PCT      As 28 day mortality risk marker: \"Change in Procalcitonin Result\" (> 80 % or <=80 %) if Day 0 (or Day 1) and Day 4 values are available. Refer to http://www.[employer]-pct-calculator.com/   Change in PCT <=80 %   A decrease of PCT levels below or equal to 80 % defines a positive change in PCT test result representing a higher risk for 28-day all-cause mortality of patients diagnosed with severe sepsis or septic shock.  Change in PCT > 80 %   A decrease of PCT levels of more than 80 % defines a negative change in PCT result " representing a lower risk for 28-day all-cause mortality of patients diagnosed with severe sepsis or septic shock.                Basic Metabolic Panel [[id number]]  (Abnormal) Collected:  04/30/19 2024    Specimen:  Blood Updated:  04/30/19 2208     Glucose 110 mg/dL      BUN 34 mg/dL      Creatinine 1.60 mg/dL      Sodium 136 mmol/L      Potassium 4.2 mmol/L      Chloride 99 mmol/L      CO2 24.2 mmol/L      Calcium 11.2 mg/dL      eGFR Non African Amer 42 mL/min/1.73      BUN/Creatinine Ratio 21.3     Anion Gap 12.8 mmol/L     Narrative:       GFR Normal >60  Chronic Kidney Disease <60  Kidney Failure <15    BNP [[id number]]  (Normal) Collected:  04/30/19 2024    Specimen:  Blood Updated:  04/30/19 2205     proBNP 670.4 pg/mL     Narrative:       Among patients with dyspnea, NT-proBNP is highly sensitive for the detection of acute congestive heart failure. In addition NT-proBNP of <300 pg/ml effectively rules out acute congestive heart failure with 99% negative predictive value.    Urinalysis, Microscopic Only - Urine, Clean Catch [[id number]]  (Abnormal) Collected:  04/30/19 1959    Specimen:  Urine, Clean Catch Updated:  04/30/19 2136     RBC, UA 13-20 /HPF      WBC, UA Too Numerous to Count /HPF      Bacteria, UA None Seen /HPF      Squamous Epithelial Cells, UA 0-2 /HPF      Transitional Epithelial Cells, UA 7-12 /HPF      Hyaline Casts, UA 13-20 /LPF      Methodology Manual Light Microscopy    Protime-INR [[id number]]  (Abnormal) Collected:  04/30/19 2024    Specimen:  Blood Updated:  04/30/19 2110     Protime 15.3 Seconds      INR 1.24    Urinalysis With Culture If Indicated - Urine, Clean Catch [[id number]]  (Abnormal) Collected:  04/30/19 1959    Specimen:  Urine, Clean Catch Updated:  04/30/19 2055     Color, UA Dark Yellow     Appearance, UA Turbid     pH, UA <=5.0     Specific Gravity, UA 1.026     Glucose, UA Negative     Ketones, UA Negative     Bilirubin, UA Negative     Blood, UA Large (3+)     Protein,  UA >=300 mg/dL (3+)     Leuk Esterase, UA Moderate (2+)     Nitrite, UA Negative     Urobilinogen, UA 1.0 E.U./dL    CBC & Differential [[id number]] Collected:  04/30/19 2024    Specimen:  Blood Updated:  04/30/19 2053    Narrative:       The following orders were created for panel order CBC & Differential.  Procedure                               Abnormality         Status                     ---------                               -----------         ------                     CBC Auto Differential[[id number]]        Abnormal            Final result                 Please view results for these tests on the individual orders.    CBC Auto Differential [[id number]]  (Abnormal) Collected:  04/30/19 2024    Specimen:  Blood Updated:  04/30/19 2053     WBC 14.45 10*3/mm3      RBC 3.25 10*6/mm3      Hemoglobin 8.6 g/dL      Hematocrit 27.8 %      MCV 85.5 fL      MCH 26.5 pg      MCHC 30.9 g/dL      RDW 14.8 %      RDW-SD 46.8 fl      MPV 9.3 fL      Platelets 345 10*3/mm3      Neutrophil % 90.1 %      Lymphocyte % 3.5 %      Monocyte % 5.5 %      Eosinophil % 0.1 %      Basophil % 0.2 %      Immature Grans % 0.6 %      Neutrophils, Absolute 13.02 10*3/mm3      Lymphocytes, Absolute 0.51 10*3/mm3      Monocytes, Absolute 0.80 10*3/mm3      Eosinophils, Absolute 0.01 10*3/mm3      Basophils, Absolute 0.03 10*3/mm3      Immature Grans, Absolute 0.08 10*3/mm3      nRBC 0.0 /100 WBC     Lactic Acid, Plasma [[id number]]  (Normal) Collected:  04/30/19 2024    Specimen:  Blood Updated:  04/30/19 2052     Lactate 1.3 mmol/L     Blood Culture - Blood, Hand, Right [[id number]] Collected:  04/30/19 2024    Specimen:  Blood from Hand, Right Updated:  04/30/19 2033    Blood Culture - Blood, Hand, Right [[id number]] Collected:  04/30/19 2024    Specimen:  Blood from Hand, Right Updated:  04/30/19 2032           Imaging:  Imaging Results (last 24 hours)     Procedure Component Value Units Date/Time    CT Head Without Contrast [[id number]]  Collected:  05/01/19 0831     Updated:  05/01/19 0946    Narrative:       NONCONTRAST HEAD CT 05/01/2019     CLINICAL HISTORY: Confusion, change in mental status.     TECHNIQUE: Spiral CT images were obtained from the base of the skull to  the vertex without intravenous contrast. Images were reformatted and  submitted in 3 mm thick axial CT section with brain algorithm and 2 mm  sagittal and coronal reconstructions were performed and submitted in  brain algorithm.      COMPARISON: There are no prior studies for comparison.     FINDINGS: There is some mild patchy low-density in the periventricular  white matter consistent with mild small vessel disease. The remainder of  the brain parenchyma is normal in attenuation. The ventricles are normal  in size. I see no focal mass effect and no midline shift. No extra-axial  fluid collections are identified. There is no evidence of acute  intracranial hemorrhage. There are calcified atherosclerotic plaques in  the intracranial segments of the distal vertebral arteries and cavernous  segments of the internal carotid arteries bilaterally. There is partial  opacification of the maxillary, ethmoid and left sphenoid sinus with  fluid and mucosal thickening. The mastoid and middle ear cavities are  clear.       Impression:       1. There is mild small vessel disease in the cerebral white matter and  mild cerebral atrophy. There are calcified atherosclerotic plaques in  the intracranial segments of the distal vertebral arteries and cavernous  segments of the internal carotid arteries bilaterally.  2. Partial opacification of the ethmoid and maxillary sinuses  bilaterally and the left sphenoid sinus with fluid and mucosal  thickening.  3. The remainder of the head CT is normal.     Radiation dose reduction techniques were utilized, including automated  exposure control and exposure modulation based on body size.     This report was finalized on 5/1/2019 9:43 AM by Dr. Eliud Owens  M.D.       CT Chest Without Contrast [[id number]] Collected:  05/01/19 0943     Updated:  05/01/19 0943    Narrative:       CT CHEST WITHOUT CONTRAST     HISTORY: Follow-up pneumonia. Weight loss.     TECHNIQUE: Axial CT images of the chest were obtained without  administration of intravenous contrast. Coronal and sagittal reformats  were obtained.     COMPARISON: CT chest dated 04/30/2019.     FINDINGS: The central airways are patent. There is marked bronchial wall  thickening demonstrated bilaterally especially prominent within  bilateral lower lobes associated with bronchial dilatation. Many  endobronchial filling defects are most consistent with mucous plugging.  Patchy irregular areas of consolidation along with reticulonodular  infiltrates are demonstrated within bilateral lower lobes. Fine  high-density foci are seen within this region. There is no pleural or  pericardial effusion present. Patchy areas of ground glass density are  also seen scattered within bilateral upper lobes and the right middle  lobe. There are multiple mildly enlarged lymph nodes seen within the  mediastinum. Index precarinal lymph node measures up to 9 mm in short  axis dimension. A particularly prominent subchondral lymph node measures  up to 1.3 cm. Calcified coronary artery disease is present. Dilatation  of the main pulmonary artery is suggestive of pulmonary arterial  hypertension. Mild dilatation of the ascending thoracic aorta that  measures up to 3.6 cm within its midportion. Calcified plaque is seen  throughout the thoracic aorta. Small sliding hiatal hernia is seen.     No pathological axillary lymphadenopathy.     The visualized upper abdomen demonstrates marked wall thickening of the  visualized proximal duodenum.       Impression:       1. There is bibasilar pneumonia with bronchial wall thickening, mucous  plugging and fine high density punctate foci. The findings are most  suggestive for aspiration. Additional areas of  patchy ground glass  infiltrate are also seen within bilateral upper lobes and the right  middle lobe. There is suggestion of underlying bronchiectasis within the  bilateral lower lobes.  2. Small sliding hiatal hernia.  3. Multiple mediastinal lymph nodes favored to be reactive. Follow-up CT  chest is recommended in 3 months to reevaluate.     Radiation dose reduction techniques were utilized, including automated  exposure control and exposure modulation based on body size.                   Assessment:       Weight loss    Aspiration pneumonia due to vomitus (CMS/HCC)    Bladder mass    Hydronephrosis of left kidney    Duodenitis    Anemia    Probable invasive bladder cancer with right hydronephrosis.    Plan:     He will need cystoscopy when he is medically cleared for resection of this tumor and stent placement as soon as feasible.  Discussed the findings with the patient.  He seems to acknowledge the findings and understands what we need to do.    Gregorio Harris MD  05/01/19  11:38 AM

## 2019-05-01 NOTE — CONSULTS
"Adult Nutrition  Assessment/PES    Patient Name:  Modesto Diego  YOB: 1940  MRN: [id number]  Admit Date:  4/30/2019    Assessment Date:  5/1/2019    Comments:  Nutrition assessment and MSA completed due to hx of wt loss and decrease po intake.    Reason for Assessment     Row Name 05/01/19 1319          Reason for Assessment    Reason For Assessment  identified at risk by screening criteria     Diagnosis  -- PNA, bladder mass, wt loss, gastritis         Nutrition/Diet History     Row Name 05/01/19 1322          Nutrition/Diet History    Typical Food/Fluid Intake  decrease po, and weakness         Anthropometrics     Row Name 05/01/19 1323          Anthropometrics    Height  177.8 cm (70\")     Weight  72.1 kg (159 lb)        Ideal Body Weight (IBW)    Ideal Body Weight (IBW) (kg)  76.48     % Ideal Body Weight  94.3        Usual Body Weight (UBW)    Usual Body Weight  90.7 kg (200 lb)     % Usual Body Weight  79.5     Weight Loss  unintentional     Weight Loss Time Frame  40lb x 1 year        Body Mass Index (BMI)    BMI (kg/m2)  22.86     BMI Assessment  BMI 18.5-24.9: normal        IBW Adjustment, Para/Tetraplegia    5% Adjustment, Para (IBW)  72.66     10% Adjustment, Para (IBW)  68.83     10% Adjustment, Tetra (IBW)  68.83     15% Adjustment, Tetra (IBW)  65.01         Labs/Tests/Procedures/Meds     Row Name 05/01/19 1324          Labs/Procedures/Meds    Lab Results Reviewed  reviewed     Lab Results Comments  glu, bun, cr, alb, H/H, wbc        Diagnostic Tests/Procedures    Diagnostic Test/Procedure Reviewed  reviewed        Medications    Pertinent Medications Reviewed  reviewed     Pertinent Medications Comments  abx, insulin, protonix,         Physical Findings     Row Name 05/01/19 1325          Physical Findings    Skin  -- rah Starks         Estimated/Assessed Needs     Row Name 05/01/19 1325 05/01/19 1323       Calculation Measurements    Weight Used For Calculations  72.1 kg (158 lb 15.2 " "oz)  --    Height  --  177.8 cm (70\")       Estimated/Assessed Needs    Additional Documentation  Calorie Requirements (Group);Protein Requirements (Group)  --       Calorie Requirements    Weight Used For Calorie Calculations  72.1 kg (158 lb 15.2 oz)  --    Estimated Calorie Requirement (kcal/day)  2160 30kcals per kg  --       KCAL/KG    14 Kcal/Kg (kcal)  1009.4  --    15 Kcal/Kg (kcal)  1081.5  --    18 Kcal/Kg (kcal)  1297.8  --    20 Kcal/Kg (kcal)  1442  --    25 Kcal/Kg (kcal)  1802.5  --    30 Kcal/Kg (kcal)  2163  --    35 Kcal/Kg (kcal)  2523.5  --    40 Kcal/Kg (kcal)  2884  --    45 Kcal/Kg (kcal)  3244.5  --    50 Kcal/Kg (kcal)  3605  --       Fillmore-St. Jeor Equation    RMR (Fillmore-St. Jeor Equation)  1437.25  --       Protein Requirements    Weight Used For Protein Calculations  72.1 kg (158 lb 15.2 oz)  --    Est Protein Requirement Amount (gms/kg)  1.0 gm protein  --    Estimated Protein Requirements (gms/day)  72.1  --       Fluid Requirements    Lloyd-Isidoro Method (over 20 kg)  2942  --        Nutrition Prescription Ordered     Row Name 05/01/19 1326          Nutrition Prescription PO    Current PO Diet  NPO         Evaluation of Received Nutrient/Fluid Intake     Row Name 05/01/19 1325 05/01/19 1323       Calculation Measurements    Weight Used For Calculations  72.1 kg (158 lb 15.2 oz)  --    Height  --  177.8 cm (70\")        Evaluation of Prescribed Nutrient/Fluid Intake     Row Name 05/01/19 1325 05/01/19 1323       Calculation Measurements    Weight Used For Calculations  72.1 kg (158 lb 15.2 oz)  --    Height  --  177.8 cm (70\")        Malnutrition Severity Assessment     Row Name 05/01/19 1329          Malnutrition Severity Assessment    Malnutrition Type  Acute Illness/Injury Malnutrition        Weight Status (Acute)    %UBW  Mod (75-85%)     Weight Loss  Mild (>10%/1yr)        Energy Intake Status (Acute)    Energy Intake  Mod (<75% / >7d)        Criteria Met (Must meet criteria " for severity in at least 2 of these categories: M Wasting, Fat Loss, Fluid, Secondary Signs, Wt. Status, Intake)    Patient meets criteria for   Moderate malnutrition           Problem/Interventions:  Problem 1     Row Name 05/01/19 1327          Nutrition Diagnoses Problem 1    Problem 1  Unintended Weight Loss     Etiology (related to)  -- pnu, gastritis, bladder mass     Signs/Symptoms (evidenced by)  % UBW     Percent (%) UBW  79 %                 Intervention Goal     Row Name 05/01/19 1328          Intervention Goal    General  Meet nutritional needs for age/condition     PO  Initiate feeding;PO intake (%)     PO Intake %  100 %     Weight  Maintain weight         Nutrition Intervention     Row Name 05/01/19 1328          Nutrition Intervention    RD/Tech Action  Care plan reviewd;Follow Tx progress;Interview for preference;Supplement provided         Nutrition Prescription     Row Name 05/01/19 1328          Nutrition Prescription PO    PO Prescription  Begin/change supplement     Supplement  Boost Plus when diet is started         Education/Evaluation     Row Name 05/01/19 1329          Education    Education  Will Instruct as appropriate        Monitor/Evaluation    Monitor  Per protocol           Electronically signed by:  Sheree Cavanaugh RD  05/01/19 1:32 PM

## 2019-05-01 NOTE — THERAPY EVALUATION
Acute Care - Speech Language Pathology   Swallow Initial Evaluation [facility]     Patient Name: Modesto Diego  : 1940  MRN: [id number]  Today's Date: 2019  Onset of Illness/Injury or Date of Surgery: 19     Referring Physician: Fr[place]      Admit Date: 2019    Visit Dx:     ICD-10-CM ICD-9-CM   1. Unsteadiness on feet R26.81 781.2     Patient Active Problem List   Diagnosis   • Weight loss   • Aspiration pneumonia due to vomitus (CMS/HCC)   • Bladder mass   • Hydronephrosis of left kidney   • Duodenitis   • Anemia     History reviewed. No pertinent past medical history.  History reviewed. No pertinent surgical history.     SWALLOW EVALUATION (last 72 hours)      SLP Adult Swallow Evaluation     Row Name 19 1345                   Rehab Evaluation    Document Type  evaluation  -        Subjective Information  no complaints  -SH        Patient Observations  alert;cooperative  -        Patient Effort  good  -           General Information    Patient Profile Reviewed  yes  -SH        Pertinent History Of Current Problem  Weight loss, bilateral pna MD suspect aspiration, large bladder mass. Pt required assistance to use his ipad this date, which is not difficult for pt at baseline per wife.   -SH        Current Method of Nutrition  NPO  -        Precautions/Limitations, Vision  WFL;for purposes of eval  -        Precautions/Limitations, Hearing  WFL;for purposes of eval  -        Prior Level of Function-Communication  WFL  -        Prior Level of Function-Swallowing  no diet consistency restrictions  -        Plans/Goals Discussed with  patient;family  -        Barriers to Rehab  cognitive status  -        Patient's Goals for Discharge  return to regular diet  -        Family Goals for Discharge  patient able to return to regular diet  -           Pain Scale: Numbers Pre/Post-Treatment    Pain Scale: Numbers, Pretreatment  0/10 - no pain  -        Pain Scale:  "Numbers, Post-Treatment  0/10 - no pain  -           Oral Motor and Function    Dentition Assessment  upper dentures/partial in place;lower dentures/partial in place  -        Secretion Management  WNL/WFL  -        Volitional Swallow  weak  -        Volitional Cough  weak  -           Oral Musculature and Cranial Nerve Assessment    Oral Motor General Assessment  WFL;vocal impairment  -           Clinical Swallow Eval    Clinical Swallow Evaluation Summary  Pt seen for bedside swallow. Wife reports voice change, no complaints of dysphagia. OME revealed reduced intensity of voice. Pt repaired his own dentures, therefore the top dentures have an excess amount of \"glue\" per patient. No s/s of asp with ice. Consistent voice change with thins via spoon, cup, and straw. Pt cleared voice with throat clear/re swallow when cued. No overt s/s of asp with nectar via cup and straw. Adequate AP transit with puree. Rotary mastication pattern with mech soft and regular. Wet voice appreciated with mixed. No oral residue post swallow. SLP recs regular and nectar, no mixed, straws ok. Allow free water protocol with ice 30 mins after PO after oral care. Will complete VFSS next date to further assess.   -           Clinical Impression    SLP Swallowing Diagnosis  suspected pharyngeal dysfunction  -        Functional Impact  risk of aspiration/pneumonia  -        Rehab Potential/Prognosis, Swallowing  good, to achieve stated therapy goals  -        Swallow Criteria for Skilled Therapeutic Interventions Met  demonstrates skilled criteria  -           Recommendations    Therapy Frequency (Swallow)  PRN  -        Predicted Duration Therapy Intervention (Days)  until discharge  -        SLP Diet Recommendation  regular textures;nectar thick liquids  -        Recommended Diagnostics  reassess via VFSS ([facility])  -        Recommended Precautions and Strategies  upright posture during/after eating;volitional throat " "clear  -        SLP Rec. for Method of Medication Administration  meds whole;with thick liquids;with pudding or applesauce;as tolerated  -        Monitor for Signs of Aspiration  yes;notify SLP if any concerns  -        Anticipated Dischage Disposition  unknown  -           Swallow Goals (SLP)    Oral Nutrition/Hydration Goal Selection (SLP)  oral nutrition/hydration, SLP goal 1  -           Oral Nutrition/Hydration Goal 1 (SLP)    Oral Nutrition/Hydration Goal 1, SLP  Pt will tolerate PO without overt s/s of asp.  -        Time Frame (Oral Nutrition/Hydration Goal 1, SLP)  by discharge  -          User Key  (r) = Recorded By, (t) = Taken By, (c) = Cosigned By    Initials Name Effective Dates     Svetlana Huston MS CCC-SLP 03/07/18 -           EDUCATION  The patient has been educated in the following areas:   Dysphagia (Swallowing Impairment).    SLP Recommendation and Plan  SLP Swallowing Diagnosis: suspected pharyngeal dysfunction  SLP Diet Recommendation: regular textures, nectar thick liquids  Recommended Precautions and Strategies: upright posture during/after eating, volitional throat clear     Monitor for Signs of Aspiration: yes, notify SLP if any concerns  Recommended Diagnostics: reassess via VFSS ([facility])  Swallow Criteria for Skilled Therapeutic Interventions Met: demonstrates skilled criteria  Anticipated Dischage Disposition: unknown  Rehab Potential/Prognosis, Swallowing: good, to achieve stated therapy goals  Therapy Frequency (Swallow): PRN  Predicted Duration Therapy Intervention (Days): until discharge       Plan of Care Reviewed With: patient  Plan of Care Review  Plan of Care Reviewed With: patient  Progress: improving  Outcome Summary: Pt seen for bedside swallow. Wife reports voice change, no complaints of dysphagia. OME revealed reduced intensity of voice. Pt repaired his own dentures, therefore the top dentures have an excess amount of \"glue\" per patient. No s/s of asp with ice. " Consistent voice change with thins via spoon, cup, and straw. Pt cleared voice with throat clear/re swallow when cued. No overt s/s of asp with nectar via cup and straw. Adequate AP transit with puree. Rotary mastication pattern with mech soft and regular. Wet voice appreciated with mixed. No oral residue post swallow. SLP recs regular and nectar, no mixed, straws ok. Allow free water protocol with ice 30 mins after PO after oral care. Will complete VFSS next date to further assess.     SLP GOALS     Row Name 05/01/19 1345             Oral Nutrition/Hydration Goal 1 (SLP)    Oral Nutrition/Hydration Goal 1, SLP  Pt will tolerate PO without overt s/s of asp.  -      Time Frame (Oral Nutrition/Hydration Goal 1, SLP)  by discharge  -        User Key  (r) = Recorded By, (t) = Taken By, (c) = Cosigned By    Initials Name Provider Type     Svetlana Huston MS CCC-SLP Speech and Language Pathologist           SLP Outcome Measures (last 72 hours)      SLP Outcome Measures     Row Name 05/01/19 1400             SLP Outcome Measures    Outcome Measure Used?  Adult NOMS  -         Adult FCM Scores    FCM Chosen  Swallowing  -      Swallowing FCM Score  4  -        User Key  (r) = Recorded By, (t) = Taken By, (c) = Cosigned By    Initials Name Effective Dates     Svetlana Huston MS CCC-SLP 03/07/18 -            Time Calculation:   Time Calculation- SLP     Row Name 05/01/19 1424             Time Calculation- [facility]    SLP Start Time  1330  -      SLP Received On  05/01/19  -        User Key  (r) = Recorded By, (t) = Taken By, (c) = Cosigned By    Initials Name Provider Type     Svetlana Huston MS CCC-SLP Speech and Language Pathologist          Therapy Charges for Today     Code Description Service Date Service Provider Modifiers Qty    [id number] HC ST EVAL ORAL PHARYNG SWALLOW 3 5/1/2019 Svetlana Huston MS CCC-SLP GN 1               Svetlana Huston MS CCC-BESSIE  5/1/2019

## 2019-05-01 NOTE — H&P (VIEW-ONLY)
FIRST UROLOGY CONSULT      Patient Identification:  NAME:  Modesto Diego  Age:  78 y.o.   Sex:  male   :  1940   MRN:  [id number]       Chief complaint: Hematuria    History of present illness: This is a 78-year-old gentleman who is been having some intermittent hematuria for several weeks now.  He is not told no one about this.  He is a previous smoker in the past pretty heavy but nothing recently.  He does have some obstructive urinary symptoms with a weak stream sometimes hesitancy and urge to go the bathroom.  He comes in with weight loss and now possible pneumonia from aspiration with vomiting.  Imaging shows a large bladder mass with significant right hydronephrosis suggestive of invasive bladder cancer.  He has had no prior urologic intervention.      Past medical history:  History reviewed. No pertinent past medical history.    Past surgical history:  History reviewed. No pertinent surgical history.    Allergies:  Amoxicillin    Home medications:  Medications Prior to Admission   Medication Sig Dispense Refill Last Dose   • pravastatin (PRAVACHOL) 40 MG tablet Take 40 mg by mouth Daily.   2019 at Unknown time        Hospital medications:    cefOXitin 2 g Intravenous Q8H   insulin lispro 0-9 Units Subcutaneous 4x Daily With Meals & Nightly   pantoprazole 40 mg Oral Q AM   pravastatin 40 mg Oral Daily   sodium chloride 3 mL Intravenous Q12H       sodium chloride 125 mL/hr Last Rate: 125 mL/hr (19 0514)     •  acetaminophen  •  acetaminophen  •  dextrose  •  dextrose  •  glucagon (human recombinant)  •  nitroglycerin  •  ondansetron  •  ondansetron  •  sodium chloride    Family history:  History reviewed. No pertinent family history.    Social history:  Social History     Tobacco Use   • Smoking status: Former Smoker     Packs/day: 0.50     Years: 22.00     Pack years: 11.00     Types: Cigarettes     Last attempt to quit: 1979     Years since quittin.3   • Smokeless tobacco:  Never Used   Substance Use Topics   • Alcohol use: No     Frequency: Never   • Drug use: No       Review of systems:    Negative 12-system ROS except for the following: Shortness of breath some degree of chest pain with inspiration.  Obstructive and irritable urinary symptoms as discussed above.  Vomiting but nothing at this point.  Weight loss as discussed above.      Objective:  TMax 24 hours:   Temp (24hrs), Av.2 °F (36.8 °C), Min:97.9 °F (36.6 °C), Max:99.1 °F (37.3 °C)      Vitals Ranges:   Temp:  [97.9 °F (36.6 °C)-99.1 °F (37.3 °C)] 97.9 °F (36.6 °C)  Heart Rate:  [] 88  Resp:  [16-20] 16  BP: (122-147)/(64-86) 122/64    Intake/Output Last 3 shifts:  I/O last 3 completed shifts:  In: -   Out: 475 [Urine:475]     Physical Exam:       General Appearance:    Alert, cooperative, in no acute distress   Head:    Normocephalic, without obvious abnormality, atraumatic   Eyes:          PERRL, conjunctivae and corneas clear   Ears:    Normal external inspection   Throat:   No oral lesions, oral mucosa moist   Neck:   Supple, no LAD, trachea midline   Back:     No CVA tenderness   Lungs:     Respirations unlabored, symmetric excursion    Heart:    RRR, intact peripheral pulses   Abdomen:     Soft, NDNT, no masses, no guarding   :    Testes descended bilaterally, no nodules, no mass. Epididymi normal.  Penis normal.     No scrotal or penile rashes noted.   EMILIANO:  Extremities:  Deferred.  No edema, no deformity   Skin:   No bleeding, bruising or rashes   Neuro/Psych:   Orientation intact, mood/affect pleasant, no focal findings       Results review:   I reviewed the patient's new clinical results.    Data review:  Lab Results (last 24 hours)     Procedure Component Value Units Date/Time    Urine Culture - Urine, Urine, Clean Catch [[id number]]  (Normal) Collected:  19    Specimen:  Urine, Clean Catch Updated:  19     Urine Culture Culture in progress    Hemoglobin A1c [[id number]]  (Normal)  Collected:  05/01/19 0539    Specimen:  Blood Updated:  05/01/19 0938     Hemoglobin A1C 5.20 %     Narrative:       Hemoglobin A1C Ranges:    Increased Risk for Diabetes  5.7% to 6.4%  Diabetes                     >= 6.5%  Diabetic Goal                < 7.0%    Comprehensive Metabolic Panel [[id number]]  (Abnormal) Collected:  05/01/19 0539    Specimen:  Blood Updated:  05/01/19 0623     Glucose 116 mg/dL      BUN 30 mg/dL      Creatinine 1.47 mg/dL      Sodium 137 mmol/L      Potassium 4.0 mmol/L      Chloride 103 mmol/L      CO2 23.2 mmol/L      Calcium 10.6 mg/dL      Total Protein 6.5 g/dL      Albumin 3.30 g/dL      ALT (SGPT) 13 U/L      AST (SGOT) 16 U/L      Alkaline Phosphatase 70 U/L      Total Bilirubin 0.5 mg/dL      eGFR Non African Amer 46 mL/min/1.73      Globulin 3.2 gm/dL      A/G Ratio 1.0 g/dL      BUN/Creatinine Ratio 20.4     Anion Gap 10.8 mmol/L     Narrative:       GFR Normal >60  Chronic Kidney Disease <60  Kidney Failure <15    CBC Auto Differential [[id number]]  (Abnormal) Collected:  05/01/19 0539    Specimen:  Blood Updated:  05/01/19 0600     WBC 12.68 10*3/mm3      RBC 3.14 10*6/mm3      Hemoglobin 8.3 g/dL      Hematocrit 26.6 %      MCV 84.7 fL      MCH 26.4 pg      MCHC 31.2 g/dL      RDW 14.9 %      RDW-SD 45.6 fl      MPV 9.0 fL      Platelets 334 10*3/mm3      Neutrophil % 89.7 %      Lymphocyte % 3.3 %      Monocyte % 6.1 %      Eosinophil % 0.1 %      Basophil % 0.2 %      Immature Grans % 0.6 %      Neutrophils, Absolute 11.38 10*3/mm3      Lymphocytes, Absolute 0.42 10*3/mm3      Monocytes, Absolute 0.77 10*3/mm3      Eosinophils, Absolute 0.01 10*3/mm3      Basophils, Absolute 0.02 10*3/mm3      Immature Grans, Absolute 0.08 10*3/mm3      nRBC 0.0 /100 WBC     Vitamin B12 [[id number]]  (Normal) Collected:  04/30/19 2233    Specimen:  Blood Updated:  04/30/19 2333     Vitamin B-12 554 pg/mL     Folate [[id number]]  (Normal) Collected:  04/30/19 2233    Specimen:  Blood Updated:   "04/30/19 2333     Folate 5.75 ng/mL     Ferritin [[id number]]  (Abnormal) Collected:  04/30/19 2024    Specimen:  Blood Updated:  04/30/19 2243     Ferritin 649.00 ng/mL     Iron [[id number]]  (Abnormal) Collected:  04/30/19 2024    Specimen:  Blood Updated:  04/30/19 2237     Iron 13 mcg/dL     Iron Profile [[id number]]  (Abnormal) Collected:  04/30/19 2024    Specimen:  Blood Updated:  04/30/19 2237     Iron 13 mcg/dL      Iron Saturation 6 %      Transferrin 139 mg/dL      TIBC 207 mcg/dL     Reticulocytes [[id number]]  (Abnormal) Collected:  04/30/19 2024    Specimen:  Blood Updated:  04/30/19 2236     Reticulocyte % 0.57 %      Reticulocyte Absolute 0.0184 10*6/mm3     Procalcitonin [[id number]]  (Normal) Collected:  04/30/19 2024    Specimen:  Blood Updated:  04/30/19 2214     Procalcitonin 0.19 ng/mL     Narrative:       As a Marker for Sepsis (Non-Neonates):   1. <0.5 ng/mL represents a low risk of severe sepsis and/or septic shock.  1. >2 ng/mL represents a high risk of severe sepsis and/or septic shock.    As a Marker for Lower Respiratory Tract Infections that require antibiotic therapy:  PCT on Admission     Antibiotic Therapy             6-12 Hrs later  > 0.5                Strongly Recommended            >0.25 - <0.5         Recommended  0.1 - 0.25           Discouraged                   Remeasure/reassess PCT  <0.1                 Strongly Discouraged          Remeasure/reassess PCT      As 28 day mortality risk marker: \"Change in Procalcitonin Result\" (> 80 % or <=80 %) if Day 0 (or Day 1) and Day 4 values are available. Refer to http://www.[employer]s-pct-calculator.com/   Change in PCT <=80 %   A decrease of PCT levels below or equal to 80 % defines a positive change in PCT test result representing a higher risk for 28-day all-cause mortality of patients diagnosed with severe sepsis or septic shock.  Change in PCT > 80 %   A decrease of PCT levels of more than 80 % defines a negative change in PCT result " representing a lower risk for 28-day all-cause mortality of patients diagnosed with severe sepsis or septic shock.                Basic Metabolic Panel [[id number]]  (Abnormal) Collected:  04/30/19 2024    Specimen:  Blood Updated:  04/30/19 2208     Glucose 110 mg/dL      BUN 34 mg/dL      Creatinine 1.60 mg/dL      Sodium 136 mmol/L      Potassium 4.2 mmol/L      Chloride 99 mmol/L      CO2 24.2 mmol/L      Calcium 11.2 mg/dL      eGFR Non African Amer 42 mL/min/1.73      BUN/Creatinine Ratio 21.3     Anion Gap 12.8 mmol/L     Narrative:       GFR Normal >60  Chronic Kidney Disease <60  Kidney Failure <15    BNP [[id number]]  (Normal) Collected:  04/30/19 2024    Specimen:  Blood Updated:  04/30/19 2205     proBNP 670.4 pg/mL     Narrative:       Among patients with dyspnea, NT-proBNP is highly sensitive for the detection of acute congestive heart failure. In addition NT-proBNP of <300 pg/ml effectively rules out acute congestive heart failure with 99% negative predictive value.    Urinalysis, Microscopic Only - Urine, Clean Catch [[id number]]  (Abnormal) Collected:  04/30/19 1959    Specimen:  Urine, Clean Catch Updated:  04/30/19 2136     RBC, UA 13-20 /HPF      WBC, UA Too Numerous to Count /HPF      Bacteria, UA None Seen /HPF      Squamous Epithelial Cells, UA 0-2 /HPF      Transitional Epithelial Cells, UA 7-12 /HPF      Hyaline Casts, UA 13-20 /LPF      Methodology Manual Light Microscopy    Protime-INR [[id number]]  (Abnormal) Collected:  04/30/19 2024    Specimen:  Blood Updated:  04/30/19 2110     Protime 15.3 Seconds      INR 1.24    Urinalysis With Culture If Indicated - Urine, Clean Catch [[id number]]  (Abnormal) Collected:  04/30/19 1959    Specimen:  Urine, Clean Catch Updated:  04/30/19 2055     Color, UA Dark Yellow     Appearance, UA Turbid     pH, UA <=5.0     Specific Gravity, UA 1.026     Glucose, UA Negative     Ketones, UA Negative     Bilirubin, UA Negative     Blood, UA Large (3+)     Protein,  UA >=300 mg/dL (3+)     Leuk Esterase, UA Moderate (2+)     Nitrite, UA Negative     Urobilinogen, UA 1.0 E.U./dL    CBC & Differential [[id number]] Collected:  04/30/19 2024    Specimen:  Blood Updated:  04/30/19 2053    Narrative:       The following orders were created for panel order CBC & Differential.  Procedure                               Abnormality         Status                     ---------                               -----------         ------                     CBC Auto Differential[[id number]]        Abnormal            Final result                 Please view results for these tests on the individual orders.    CBC Auto Differential [[id number]]  (Abnormal) Collected:  04/30/19 2024    Specimen:  Blood Updated:  04/30/19 2053     WBC 14.45 10*3/mm3      RBC 3.25 10*6/mm3      Hemoglobin 8.6 g/dL      Hematocrit 27.8 %      MCV 85.5 fL      MCH 26.5 pg      MCHC 30.9 g/dL      RDW 14.8 %      RDW-SD 46.8 fl      MPV 9.3 fL      Platelets 345 10*3/mm3      Neutrophil % 90.1 %      Lymphocyte % 3.5 %      Monocyte % 5.5 %      Eosinophil % 0.1 %      Basophil % 0.2 %      Immature Grans % 0.6 %      Neutrophils, Absolute 13.02 10*3/mm3      Lymphocytes, Absolute 0.51 10*3/mm3      Monocytes, Absolute 0.80 10*3/mm3      Eosinophils, Absolute 0.01 10*3/mm3      Basophils, Absolute 0.03 10*3/mm3      Immature Grans, Absolute 0.08 10*3/mm3      nRBC 0.0 /100 WBC     Lactic Acid, Plasma [[id number]]  (Normal) Collected:  04/30/19 2024    Specimen:  Blood Updated:  04/30/19 2052     Lactate 1.3 mmol/L     Blood Culture - Blood, Hand, Right [[id number]] Collected:  04/30/19 2024    Specimen:  Blood from Hand, Right Updated:  04/30/19 2033    Blood Culture - Blood, Hand, Right [[id number]] Collected:  04/30/19 2024    Specimen:  Blood from Hand, Right Updated:  04/30/19 2032           Imaging:  Imaging Results (last 24 hours)     Procedure Component Value Units Date/Time    CT Head Without Contrast [[id number]]  Collected:  05/01/19 0831     Updated:  05/01/19 0946    Narrative:       NONCONTRAST HEAD CT 05/01/2019     CLINICAL HISTORY: Confusion, change in mental status.     TECHNIQUE: Spiral CT images were obtained from the base of the skull to  the vertex without intravenous contrast. Images were reformatted and  submitted in 3 mm thick axial CT section with brain algorithm and 2 mm  sagittal and coronal reconstructions were performed and submitted in  brain algorithm.      COMPARISON: There are no prior studies for comparison.     FINDINGS: There is some mild patchy low-density in the periventricular  white matter consistent with mild small vessel disease. The remainder of  the brain parenchyma is normal in attenuation. The ventricles are normal  in size. I see no focal mass effect and no midline shift. No extra-axial  fluid collections are identified. There is no evidence of acute  intracranial hemorrhage. There are calcified atherosclerotic plaques in  the intracranial segments of the distal vertebral arteries and cavernous  segments of the internal carotid arteries bilaterally. There is partial  opacification of the maxillary, ethmoid and left sphenoid sinus with  fluid and mucosal thickening. The mastoid and middle ear cavities are  clear.       Impression:       1. There is mild small vessel disease in the cerebral white matter and  mild cerebral atrophy. There are calcified atherosclerotic plaques in  the intracranial segments of the distal vertebral arteries and cavernous  segments of the internal carotid arteries bilaterally.  2. Partial opacification of the ethmoid and maxillary sinuses  bilaterally and the left sphenoid sinus with fluid and mucosal  thickening.  3. The remainder of the head CT is normal.     Radiation dose reduction techniques were utilized, including automated  exposure control and exposure modulation based on body size.     This report was finalized on 5/1/2019 9:43 AM by Dr. Eliud Owens  M.D.       CT Chest Without Contrast [[id number]] Collected:  05/01/19 0943     Updated:  05/01/19 0943    Narrative:       CT CHEST WITHOUT CONTRAST     HISTORY: Follow-up pneumonia. Weight loss.     TECHNIQUE: Axial CT images of the chest were obtained without  administration of intravenous contrast. Coronal and sagittal reformats  were obtained.     COMPARISON: CT chest dated 04/30/2019.     FINDINGS: The central airways are patent. There is marked bronchial wall  thickening demonstrated bilaterally especially prominent within  bilateral lower lobes associated with bronchial dilatation. Many  endobronchial filling defects are most consistent with mucous plugging.  Patchy irregular areas of consolidation along with reticulonodular  infiltrates are demonstrated within bilateral lower lobes. Fine  high-density foci are seen within this region. There is no pleural or  pericardial effusion present. Patchy areas of ground glass density are  also seen scattered within bilateral upper lobes and the right middle  lobe. There are multiple mildly enlarged lymph nodes seen within the  mediastinum. Index precarinal lymph node measures up to 9 mm in short  axis dimension. A particularly prominent subchondral lymph node measures  up to 1.3 cm. Calcified coronary artery disease is present. Dilatation  of the main pulmonary artery is suggestive of pulmonary arterial  hypertension. Mild dilatation of the ascending thoracic aorta that  measures up to 3.6 cm within its midportion. Calcified plaque is seen  throughout the thoracic aorta. Small sliding hiatal hernia is seen.     No pathological axillary lymphadenopathy.     The visualized upper abdomen demonstrates marked wall thickening of the  visualized proximal duodenum.       Impression:       1. There is bibasilar pneumonia with bronchial wall thickening, mucous  plugging and fine high density punctate foci. The findings are most  suggestive for aspiration. Additional areas of  patchy ground glass  infiltrate are also seen within bilateral upper lobes and the right  middle lobe. There is suggestion of underlying bronchiectasis within the  bilateral lower lobes.  2. Small sliding hiatal hernia.  3. Multiple mediastinal lymph nodes favored to be reactive. Follow-up CT  chest is recommended in 3 months to reevaluate.     Radiation dose reduction techniques were utilized, including automated  exposure control and exposure modulation based on body size.                   Assessment:       Weight loss    Aspiration pneumonia due to vomitus (CMS/HCC)    Bladder mass    Hydronephrosis of left kidney    Duodenitis    Anemia    Probable invasive bladder cancer with right hydronephrosis.    Plan:     He will need cystoscopy when he is medically cleared for resection of this tumor and stent placement as soon as feasible.  Discussed the findings with the patient.  He seems to acknowledge the findings and understands what we need to do.    Gregorio Harris MD  05/01/19  11:38 AM

## 2019-05-01 NOTE — THERAPY EVALUATION
Acute Care - Physical Therapy Initial Evaluation  [facility]     Patient Name: Modesto Diego  : 1940  MRN: [id number]  Today's Date: 2019   Onset of Illness/Injury or Date of Surgery: 19     Referring Physician: Fransisco      Admit Date: 2019    Visit Dx:     ICD-10-CM ICD-9-CM   1. Unsteadiness on feet R26.81 781.2     Patient Active Problem List   Diagnosis   • Weight loss   • Aspiration pneumonia due to vomitus (CMS/HCC)   • Bladder mass   • Hydronephrosis of left kidney   • Duodenitis   • Anemia     History reviewed. No pertinent past medical history.  History reviewed. No pertinent surgical history.     PT ASSESSMENT (last 12 hours)      Physical Therapy Evaluation     Row Name 19 1315          PT Evaluation Time/Intention    Subjective Information  complains of;weakness;fatigue  -EE     Document Type  evaluation  -EE     Mode of Treatment  physical therapy;individual therapy  -EE     Patient Effort  good  -EE     Symptoms Noted During/After Treatment  none  -EE     Row Name 19 1315          General Information    Onset of Illness/Injury or Date of Surgery  19  -EE     Referring Physician  Fransisco  -EE     Patient Observations  alert;cooperative;agree to therapy  -EE     Patient/Family Observations  Pt supine in bed in no acute distress.  -EE     Prior Level of Function  independent:;all household mobility;community mobility increased difficulty @ home for past week  -EE     Equipment Currently Used at Home  none;other (see comments) has cane and walker--not using until 1 week ago  -EE     Pertinent History of Current Functional Problem  progressive weakness, hematuria, 40 lb weight loss over last year, mass on bladder--concerning for malignancy  -EE     Existing Precautions/Restrictions  fall  -EE     Barriers to Rehab  medically complex  -EE     Row Name 19 1315          Relationship/Environment    Lives With  spouse  -EE     Row Name 19 1313           Resource/Environmental Concerns    Current Living Arrangements  home/apartment/condo  -EE     Row Name 05/01/19 1315          Home Main Entrance    Number of Stairs, Main Entrance  four  -EE     Stair Railings, Main Entrance  railing on right side (ascending)  -EE     Row Name 05/01/19 1315          Stairs Within Home, Primary    Number of Stairs, Within Home, Primary  ten  -EE     Stair Railings, Within Home, Primary  railing on right side (ascending)  -EE     Row Name 05/01/19 1315          Cognitive Assessment/Interventions    Additional Documentation  Cognitive Assessment/Intervention (Group)  -EE     Row Name 05/01/19 1315          Cognitive Assessment/Intervention- PT/OT    Orientation Status (Cognition)  oriented x 4  -EE     Follows Commands (Cognition)  WNL  -EE     Personal Safety Interventions  fall prevention program maintained;gait belt;muscle strengthening facilitated;nonskid shoes/slippers when out of bed;supervised activity  -EE     Row Name 05/01/19 1315          Safety Issues, Functional Mobility    Impairments Affecting Function (Mobility)  balance;endurance/activity tolerance;strength  -EE     Row Name 05/01/19 1315          Bed Mobility Assessment/Treatment    Bed Mobility Assessment/Treatment  supine-sit;sit-supine  -EE     Supine-Sit [place] (Bed Mobility)  supervision;verbal cues  -EE     Sit-Supine [place] (Bed Mobility)  supervision  -     Row Name 05/01/19 1315          Transfer Assessment/Treatment    Transfer Assessment/Treatment  sit-stand transfer;stand-sit transfer  -     Comment (Transfers)  verbal cues required for hand placement  -EE     Sit-Stand [place] (Transfers)  contact guard;verbal cues  -EE     Stand-Sit [place] (Transfers)  contact guard;verbal cues  -     Row Name 05/01/19 1315          Sit-Stand Transfer    Assistive Device (Sit-Stand Transfers)  walker, front-wheeled  -     Row Name 05/01/19 1315          Gait/Stairs Assessment/Training     [place] Level (Gait)  contact guard;verbal cues  -EE     Assistive Device (Gait)  walker, front-wheeled  -EE     Distance in Feet (Gait)  200  -EE     Pattern (Gait)  step-through  -EE     Deviations/Abnormal Patterns (Gait)  ronal decreased;stride length decreased  -EE     Bilateral Gait Deviations  forward flexed posture  -EE     Comment (Gait/Stairs)  verbal cues required for upright posture  -EE     Row Name 05/01/19 1315          MMT (Manual Muscle Testing)    General MMT Comments  B LE AROM WFL  -EE     Row Name 05/01/19 1315          Motor Assessment/Intervention    Additional Documentation  Therapeutic Exercise Interventions (Group);Balance (Group)  -EE     Row Name 05/01/19 1315          Therapeutic Exercise    Lower Extremity (Therapeutic Exercise)  LAQ (long arc quad), bilateral;marching while seated  -EE     Lower Extremity Range of Motion (Therapeutic Exercise)  ankle dorsiflexion/plantar flexion, bilateral  -EE     Exercise Type (Therapeutic Exercise)  AROM (active range of motion)  -EE     Position (Therapeutic Exercise)  seated  -EE     Sets/Reps (Therapeutic Exercise)  1/10  -EE     Row Name 05/01/19 1315          Balance    Balance  static sitting balance;static standing balance  -EE     Row Name 05/01/19 1315          Static Sitting Balance    Level of [place] (Unsupported Sitting, Static Balance)  supervision  -EE     Sitting Position (Unsupported Sitting, Static Balance)  sitting on edge of bed  -EE     Time Able to Maintain Position (Unsupported Sitting, Static Balance)  more than 5 minutes  -EE     Row Name 05/01/19 1315          Static Standing Balance    Level of [place] (Supported Standing, Static Balance)  standby assist;contact guard assist  -EE     Time Able to Maintain Position (Supported Standing, Static Balance)  1 to 2 minutes  -EE     Assistive Device Utilized (Supported Standing, Static Balance)  walker, rolling  -EE     Row Name 05/01/19 1315          Pain  Assessment    Additional Documentation  Pain Scale: Numbers Pre/Post-Treatment (Group)  -EE     Row Name 05/01/19 1315          Pain Scale: Numbers Pre/Post-Treatment    Pain Scale: Numbers, Pretreatment  0/10 - no pain  -EE     Pain Scale: Numbers, Post-Treatment  0/10 - no pain  -EE     Row Name 05/01/19 1315          Plan of Care Review    Plan of Care Reviewed With  patient  -EE     Row Name 05/01/19 1315          Physical Therapy Clinical Impression    Patient/Family Goals Statement (PT Clinical Impression)  Get stronger, go home  -EE     Criteria for Skilled Interventions Met (PT Clinical Impression)  yes;treatment indicated  -EE     Pathology/Pathophysiology Noted (Describe Specifically for Each System)  musculoskeletal  -EE     Impairments Found (describe specific impairments)  gait, locomotion, and balance;aerobic capacity/endurance  -EE     Rehab Potential (PT Clinical Summary)  good, to achieve stated therapy goals  -EE     Row Name 05/01/19 1315          Physical Therapy Goals    Transfer Goal Selection (PT)  transfer, PT goal 1  -EE     Gait Training Goal Selection (PT)  gait training, PT goal 1  -EE     Stairs Goal Selection (PT)  stairs, PT goal 1  -EE     Additional Documentation  Stairs Goal Selection (PT) (Row)  -EE     Row Name 05/01/19 1310          Transfer Goal 1 (PT)    Activity/Assistive Device (Transfer Goal 1, PT)  sit-to-stand/stand-to-sit  -EE     [place] Level/Cues Needed (Transfer Goal 1, PT)  supervision required  -EE     Time Frame (Transfer Goal 1, PT)  1 week  -EE     Progress/Outcome (Transfer Goal 1, PT)  goal ongoing  -EE     Row Name 05/01/19 131          Gait Training Goal 1 (PT)    Activity/Assistive Device (Gait Training Goal 1, PT)  gait (walking locomotion) with least restrictive AD  -EE     [place] Level (Gait Training Goal 1, PT)  standby assist  -EE     Distance (Gait Goal 1, PT)  300  -EE     Time Frame (Gait Training Goal 1, PT)  1 week  -EE      Progress/Outcome (Gait Training Goal 1, PT)  goal ongoing  -EE     Row Name 05/01/19 1315          Stairs Goal 1 (PT)    Activity/Assistive Device (Stairs Goal 1, PT)  ascending stairs;descending stairs  -EE     [place] Level/Cues Needed (Stairs Goal 1, PT)  contact guard assist  -EE     Number of Stairs (Stairs Goal 1, PT)  10  -EE     Time Frame (Stairs Goal 1, PT)  1 week  -EE     Progress/Outcome (Stairs Goal 1, PT)  goal ongoing  -EE     Row Name 05/01/19 1315          Positioning and Restraints    Pre-Treatment Position  in bed  -EE     Post Treatment Position  bed  -EE     In Bed  fowlers;call light within reach;encouraged to call for assist;exit alarm on;with family/caregiver;notified nsg  -EE     Row Name 05/01/19 1315          Living Environment    Home Accessibility  stairs to enter home;stairs within home  -EE       User Key  (r) = Recorded By, (t) = Taken By, (c) = Cosigned By    Initials Name Provider Type    EE Umu Monroy PT Physical Therapist        Physical Therapy Education     Title: PT OT SLP Therapies (In Progress)     Topic: Physical Therapy (In Progress)     Point: Mobility training (Done)     Learning Progress Summary           Patient Acceptance, E,TB,D, VU,NR by EE at 5/1/2019  1:57 PM                   Point: Home exercise program (Done)     Learning Progress Summary           Patient Acceptance, E,TB,D, VU,NR by EE at 5/1/2019  1:57 PM                   Point: Body mechanics (Done)     Learning Progress Summary           Patient Acceptance, E,TB,D, VU,NR by EE at 5/1/2019  1:57 PM                               User Key     Initials Effective Dates Name Provider Type Discipline    EE 04/03/18 -  Umu Monroy, KARLA Physical Therapist PT              PT Recommendation and Plan  Anticipated Discharge Disposition (PT): home with assist, home with home health  Planned Therapy Interventions (PT Eval): balance training, bed mobility training, gait training, home exercise program,  patient/family education, ROM (range of motion), stair training, strengthening, stretching, transfer training  Therapy Frequency (PT Clinical Impression): daily  Outcome Summary/Treatment Plan (PT)  Anticipated Discharge Disposition (PT): home with assist, home with home health  Plan of Care Reviewed With: patient  Outcome Summary: Pt is an 79 yo male who presents from home w/progressive weakness, weight loss, hematuria, and bladder mass. Upon exam, pt demonstrates generalized weakness, impaired balance, and decreased endurance limiting mobility. Pt was independent w/o AD until recently; reports he has been having increased difficulty with mobility for the past week. Pt currently requires CGA and use of walker for safety and would benefit from continued PT to address impairments, improve safety, and increase independence with functional mobility. Will progress to least restrictive AD as tolerated.   Outcome Measures     Row Name 05/01/19 1300             How much help from another person do you currently need...    Turning from your back to your side while in flat bed without using bedrails?  4  -EE      Moving from lying on back to sitting on the side of a flat bed without bedrails?  3  -EE      Moving to and from a bed to a chair (including a wheelchair)?  3  -EE      Standing up from a chair using your arms (e.g., wheelchair, bedside chair)?  3  -EE      Climbing 3-5 steps with a railing?  2  -EE      To walk in hospital room?  3  -EE      AM-PAC 6 Clicks Score  18  -EE         Functional Assessment    Outcome Measure Options  AM-PAC 6 Clicks Basic Mobility (PT)  -EE        User Key  (r) = Recorded By, (t) = Taken By, (c) = Cosigned By    Initials Name Provider Type    Umu Devine PT Physical Therapist         Time Calculation:   PT Charges     Row Name 05/01/19 1359             Time Calculation    Start Time  1315  -EE      Stop Time  1335  -EE      Time Calculation (min)  20 min  -EE      PT Received On   05/01/19  -EE      PT - Next Appointment  05/02/19  -EE      PT Goal Re-Cert Due Date  05/08/19  -EE         Time Calculation- PT    Total Timed Code Minutes- PT  8 minute(s)  -EE        User Key  (r) = Recorded By, (t) = Taken By, (c) = Cosigned By    Initials Name Provider Type    EE Umu Monroy, PT Physical Therapist        Therapy Charges for Today     Code Description Service Date Service Provider Modifiers Qty    04035183691 HC PT EVAL MOD COMPLEXITY 2 5/1/2019 Umu Monroy, PT GP 1    61072861789 HC PT THER PROC EA 15 MIN 5/1/2019 Umu Monroy, PT GP 1          PT G-Codes  Outcome Measure Options: AM-PAC 6 Clicks Basic Mobility (PT)  AM-PAC 6 Clicks Score: 18      Umu Monroy PT  5/1/2019

## 2019-05-01 NOTE — H&P
Internal medicine history and physical  INTERNAL MEDICINE   [facility]       Patient Identification:  Name: Modesto Diego  Age: 78 y.o.  Sex: male  :  1940  MRN: [id number]                     Primary Care Physician: Mg Root MD                                   Chief Complaint: Sent as a direct admission by Dr. Mg Alcala for abnormal CT scan of abdomen performed as a part of the work-up of the weight loss.    History of Present Illness:   Patient himself is unable to give detailed account of his symptoms because of his poor recollection of events and acutely her inability to speak clearly.  Patient is a 78-year-old male who usually spent most of his time in [facility] and works at [employer] sees Dr. Root on an yearly basis for annual physical.  According to him compared to last year he has lost significant amount of weight and he went to see Dr. Root as a routine yearly checkup on 2019.  Patient carries underlying history of diabetes, hypertension, gastroesophageal reflux disease, dyslipidemia and reactive airway disease.  Evidently he was noted to have lost around 40 pounds in the last year or so.  Patient also admits to be getting weaker and declining.  He also has decrease in appetite.  It appears that patient had a job site evaluation for his health and he communicated his early evaluation to Dr. Root on  of this month.  And that initiated above work-up.  CT scan of the abdomen and pelvis performed earlier today showed findings of extensive duodenitis involving the first and second portion of the duodenum no free air is visualized he was also noted to have large soft tissue density mass centered within the right aspect of the bladder with upstream severe right hydronephrosis concerning for bladder malignancy along with enlarged right pelvic sidewall lymph node concerning for metastatic disease.  He will also noted to have bilateral bibasilar  pneumonia concerning for aspiration in etiology.  Patient is vague in describing his symptoms but in essence admits that he is getting weaker but denies any fever and chills and does admit that he does not have good appetite he also denies any significant urinary symptoms or flank pain.  Denies any nausea or vomiting.      Past Medical History:  History reviewed. No pertinent past medical history.  Past Surgical History:  History reviewed. No pertinent surgical history.   Home Meds:  Medications Prior to Admission   Medication Sig Dispense Refill Last Dose   • pravastatin (PRAVACHOL) 40 MG tablet Take 40 mg by mouth Daily.   2019 at Unknown time     Current Meds:     Current Facility-Administered Medications:   •  acetaminophen (TYLENOL) tablet 650 mg, 650 mg, Oral, Q6H PRN, Pamela Cronin MD  •  ceFOXITin (MEFOXIN) 2 g in 100 mL 0.9% Sodium Chloride IVPB ( ASHLIE), 2 g, Intravenous, Q8H, Pamela Cronin MD  •  ondansetron (ZOFRAN) injection 4 mg, 4 mg, Intravenous, Q6H PRN, Pamela Cronin MD  •  sodium chloride 0.9 % infusion, 125 mL/hr, Intravenous, Continuous, Pamela Cronin MD, Last Rate: 125 mL/hr at 19, 125 mL/hr at 19  Allergies:  Allergies   Allergen Reactions   • Amoxicillin Hives     Social History:   Social History     Tobacco Use   • Smoking status: Former Smoker     Packs/day: 0.50     Years: 22.00     Pack years: 11.00     Types: Cigarettes     Last attempt to quit: 1979     Years since quittin.3   • Smokeless tobacco: Never Used   Substance Use Topics   • Alcohol use: No     Frequency: Never      Family History:  History reviewed. No pertinent family history.       Review of Systems  See history of present illness and past medical history.  Constitutional: Remarkable for no fever or chills, but does have progressive weakness and lack of energy.  Cardiovascular: Remarkable for no orthopnea PND or swelling of his legs  Respiratory: Remarkable for no cough or congestion does  "have dyspnea on exertion and decreased functional capacity  GI: Remarkable for decreased appetite but denies any abdominal pain.  Does have weight loss of 40 pounds  : Remarkable for no burning in urination frequency or urgency.  Neurological: Remarkable for progressive weakness but denies any focal neurological deficits.  Musculoskeletal: Remarkable for no new joint aches and pain.    Vitals:   /73 (BP Location: Left arm, Patient Position: Lying)   Pulse 107   Temp 98 °F (36.7 °C) (Oral)   Resp 20   Ht 177.8 cm (70\")   Wt 72.1 kg (159 lb)   SpO2 95%   BMI 22.81 kg/m²   I/O: No intake or output data in the 24 hours ending 04/30/19 2200  Exam:  General Appearance:    Alert, cooperative, no distress, appears stated age, appears chronically ill.   Head:    Normocephalic, without obvious abnormality, atraumatic   Eyes:    PERRL, conjunctiva/corneas clear, EOM's intact, both eyes   Ears:    Normal external ear canals, both ears   Nose:   Nares normal, septum midline, mucosa normal, no drainage    or sinus tenderness   Throat:   Lips, tongue, gums normal; oral mucosa pink and moist   Neck:   Supple, symmetrical, trachea midline, no adenopathy;     thyroid:  no enlargement/tenderness/nodules; no carotid    bruit or JVD   Back:     Symmetric, no curvature, ROM normal, no CVA tenderness   Lungs:    No use of accessory muscles of breathing noted but does have bibasilar crackles.   Chest Wall:    No tenderness or deformity    Heart:    Regular rate and rhythm, S1 and S2 normal, no murmur, rub   or gallop   Abdomen:     Soft, non-tender, bowel sounds active all four quadrants,     no masses, no hepatomegaly, no splenomegaly   Extremities:   Extremities normal, atraumatic, no cyanosis or edema   Pulses:   Pulses palpable in all extremities; symmetric all extremities   Skin:   Skin color normal, Skin is warm and dry,  no rashes or palpable lesions   Neurologic:  His speech pattern is peculiar and his memory is " suspect.  But does not appear to have focal neurological deficits.       Data Review:      I reviewed the patient's new clinical results.  Results from last 7 days   Lab Units 04/30/19 2024   WBC 10*3/mm3 14.45*   HEMOGLOBIN g/dL 8.6*   PLATELETS 10*3/mm3 345           Assessment:  Active Hospital Problems    Diagnosis POA   • **Weight loss [R63.4] Unknown   • Aspiration pneumonia due to vomitus (CMS/HCC) [J69.0] Unknown   • Bladder mass [N32.89] Unknown   • Hydronephrosis of left kidney [N13.30] Unknown   • Duodenitis [K29.80] Unknown   • Anemia [D64.9] Unknown       Medical decision making:  Weight loss with abnormal CT scan of the abdomen pelvis concerning for bladder mass and right-sided hydronephrosis with adjacent lymph node enlargement concerning for locally invasive metastatic disease-plan is to admit the patient urology consultation, check postvoid residual.  Follow-up on CMP.  Check urinalysis and send it for urine culture.  Bibasilar lung infiltrate with leukocytosis-concerning for recurrent aspiration pneumonia.  Patient is allergic to penicillin class of medications.  Start patient on cefoxitin while awaiting further data.  Get CT scan of the chest as a part of the complete work-up to rule out any other malignancy or metastatic process originating from the bladder.  Peculiar speech pattern and lack of proper recollection of time sequence and other events-rule out metastatic brain disease versus CVA.  Plan is to monitor him closely and check CT scan of his head and consider neurology evaluation.  Decreased appetite and weight loss with evidence of duodenitis seen on the CAT scan plan is to admit the patient provide him with IV fluids and IV Protonix GI consultation.  Anemia multifactorial-GI loss versus evolving malignancy versus chronic disease.  Plan is to perform basic anemia studies and further work-up based on the results.  Incomplete database-patient is not the greatest historian-monitor his  creatinine, provide him with IV fluids.  Diabetes mellitus history of-check hemoglobin A1c and provide him with Accu-Cheks and sliding scale coverage.    Pamela Cronin MD   4/30/2019  10:00 PM  Much of this encounter note is an electronic transcription/translation of spoken language to printed text. The electronic translation of spoken language may permit erroneous, or at times, nonsensical words or phrases to be inadvertently transcribed; Although I have reviewed the note for such errors, some may still exist

## 2019-05-01 NOTE — CONSULTS
Malnutrition Severity Assessment    Patient Name:  Modesto Diego  YOB: 1940  MRN: [id number]  Admit Date:  4/30/2019    Patient meets criteria for : Moderate malnutrition    Comments:  Pt c/o Wt loss 40lbs x 1 year, decrease po    Malnutrition Type: Acute Illness/Injury Malnutrition     Malnutrition Type (last 8 hours)      Malnutrition Severity Assessment     Row Name 05/01/19 1329       Malnutrition Severity Assessment    Malnutrition Type  Acute Illness/Injury Malnutrition    Row Name 05/01/19 1329       Weight Status (Acute)    %UBW  Mod (75-85%)    Weight Loss  Mild (>10%/1yr)    Row Name 05/01/19 1329       Energy Intake Status (Acute)    Energy Intake  Mod (<75% / >7d)    Row Name 05/01/19 1329       Criteria Met (Must meet criteria for severity in at least 2 of these categories: M Wasting, Fat Loss, Fluid, Secondary Signs, Wt. Status, Intake)    Patient meets criteria for   Moderate malnutrition          Electronically signed by:  Sheree Cavanaugh RD  05/01/19 1:32 PM

## 2019-05-02 NOTE — ANESTHESIA PROCEDURE NOTES
Airway  Urgency: elective    Date/Time: 5/2/2019 8:13 AM  Airway not difficult    General Information and Staff    Patient location during procedure: OR  Anesthesiologist: Gregorio Guillen MD  CRNA: Denise Arguello CRNA    Indications and Patient Condition  Indications for airway management: airway protection    Preoxygenated: yes  Mask difficulty assessment: 0 - not attempted    Final Airway Details  Final airway type: endotracheal airway      Successful airway: ETT  Cuffed: yes   Successful intubation technique: direct laryngoscopy  Facilitating devices/methods: cricoid pressure  Endotracheal tube insertion site: oral  Blade: [place]  Blade size: 2  ETT size (mm): 7.5  Cormack-Lehane Classification: grade I - full view of glottis  Placement verified by: chest auscultation   Inital cuff pressure (cm H2O): 22  Cuff volume (mL): 5  Measured from: lips  Number of attempts at approach: 1    Additional Comments  EBBS: ETCO2+: Atraumatic intubation; Lips and gums same as pre op

## 2019-05-02 NOTE — ANESTHESIA PREPROCEDURE EVALUATION
Anesthesia Evaluation     Patient summary reviewed and Nursing notes reviewed   no history of anesthetic complications:  NPO Solid Status: > 8 hours  NPO Liquid Status: > 8 hours           Airway   Mallampati: III  TM distance: >3 FB  Neck ROM: full  No difficulty expected  Dental    (+) upper dentures        Pulmonary    (+) pneumonia (aspiration) , rhonchi, decreased breath sounds,   Cardiovascular - normal exam    ECG reviewed        Neuro/Psych  Dementia: confusion.  GI/Hepatic/Renal/Endo    (+)   diabetes mellitus (reported, A1C 5.2),     Musculoskeletal     Abdominal    Substance History      OB/GYN          Other   (+) blood dyscrasia (iron deficiency anemia, hgb 7.2)     ROS/Med Hx Other: dysphagia                  Anesthesia Plan    ASA 3     general   (GETA)  intravenous induction   Anesthetic plan, all risks, benefits, and alternatives have been provided, discussed and informed consent has been obtained with: patient.

## 2019-05-02 NOTE — PLAN OF CARE
Problem: Patient Care Overview  Goal: Plan of Care Review  Outcome: Ongoing (interventions implemented as appropriate)   05/02/19 0211   Coping/Psychosocial   Plan of Care Reviewed With patient   Plan of Care Review   Progress improving   OTHER   Outcome Summary resting on and off w eyes closed, npo for scope in am, denies need for pain med. will monitor       Problem: Pneumonia (Adult)  Goal: Signs and Symptoms of Listed Potential Problems Will be Absent, Minimized or Managed (Pneumonia)  Outcome: Ongoing (interventions implemented as appropriate)   05/02/19 0211   Goal/Outcome Evaluation   Problems Assessed (Pneumonia) all   Problems Present (Pneumonia) none       Problem: Urine Elimination Impaired (Adult)  Goal: Identify Related Risk Factors and Signs and Symptoms  Outcome: Ongoing (interventions implemented as appropriate)   05/02/19 0211   Urine Elimination Impaired (Adult)   Related Risk Factors (Urine Elimination Impaired) urological disorder   Signs and Symptoms (Urine Elimination Impaired) (difficult urination)       Problem: Fall Risk (Adult)  Goal: Identify Related Risk Factors and Signs and Symptoms  Outcome: Ongoing (interventions implemented as appropriate)   05/02/19 0211   Fall Risk (Adult)   Related Risk Factors (Fall Risk) bladder function altered;fatigue/slow reaction;gait/mobility problems;fear of falling;environment unfamiliar   Signs and Symptoms (Fall Risk) presence of risk factors       Problem: Skin Injury Risk (Adult)  Goal: Identify Related Risk Factors and Signs and Symptoms  Outcome: Ongoing (interventions implemented as appropriate)   05/02/19 0211   Skin Injury Risk (Adult)   Related Risk Factors (Skin Injury Risk) body weight extremes;mobility impaired;nutritional deficiencies

## 2019-05-02 NOTE — SIGNIFICANT NOTE
05/02/19 1355   Rehab Time/Intention   Evaluation Not Performed other (see comments)  (VFSS was scheduled for this afternoon, however, was cancelled due to pt receiving news on medical status just before scheduled time. Pt and family trying to process news. ST will reschedule VFSS for 5/3.)   Rehab Treatment   Discipline speech language pathologist

## 2019-05-02 NOTE — PROGRESS NOTES
[facility]  Inpatient Progress Note    Reason for Follow Up:  duodenitis, anemia and weight loss          Subjective     Interval History:   Cysto w/bladder mass bx today.  Op note and note from Dr. Moody reviewed.  Patient is resting comfortably.  Did not disturb.  Currently receiving blood.    Current Facility-Administered Medications:   •  acetaminophen (TYLENOL) tablet 650 mg, 650 mg, Oral, Once PRN **OR** acetaminophen (TYLENOL) suppository 650 mg, 650 mg, Rectal, Once PRN, Denise Arguello CRNA  •  [MAR Hold] acetaminophen (TYLENOL) tablet 650 mg, 650 mg, Oral, Q4H PRN, Pamela Cronin MD  •  [MAR Hold] ceFOXITin (MEFOXIN) 2 g in 100 mL 0.9% Sodium Chloride IVPB ( ASHLIE), 2 g, Intravenous, Q8H, Tyrese Moody MD, 2 g at 05/02/19 0619  •  [MAR Hold] dextrose (D50W) 25 g/ 50mL Intravenous Solution 25 g, 25 g, Intravenous, Q15 Min PRN, Pamela Cronin MD  •  [MAR Hold] dextrose (GLUTOSE) oral gel 15 g, 15 g, Oral, Q15 Min PRN, Pamela Cronin MD  •  diphenhydrAMINE (BENADRYL) capsule 25 mg, 25 mg, Oral, Q30 Min PRN, Denise Arguello CRNA  •  diphenhydrAMINE (BENADRYL) injection 12.5 mg, 12.5 mg, Intravenous, Q15 Min PRN, Denise Arguello CRNA  •  ePHEDrine injection 5 mg, 5 mg, Intravenous, Once PRN, Denise Arguello CRNA  •  famotidine (PEPCID) injection 20 mg, 20 mg, Intravenous, Q12H, Tyrese Moody MD, 20 mg at 05/01/19 2029  •  fentaNYL citrate (PF) (SUBLIMAZE) injection 50 mcg, 50 mcg, Intravenous, Q5 Min PRN, Denise Arguello CRNA  •  flumazenil (ROMAZICON) injection 0.2 mg, 0.2 mg, Intravenous, PRN, Denise Arguello CRNA  •  [MAR Hold] glucagon (human recombinant) (GLUCAGEN DIAGNOSTIC) injection 1 mg, 1 mg, Subcutaneous, PRN, Pamela Cronin MD  •  hydrALAZINE (APRESOLINE) injection 5 mg, 5 mg, Intravenous, Q10 Min PRN, Denise Arguello CRNA  •  [MAR Hold] HYDROcodone-acetaminophen (NORCO) 7.5-325 MG per tablet 1 tablet, 1 tablet, Oral, Q6H  PRN, Tyrese Moody MD  •  HYDROcodone-acetaminophen (NORCO) 7.5-325 MG per tablet 1 tablet, 1 tablet, Oral, Once PRN, Denise Arguello CRNA  •  HYDROmorphone (DILAUDID) injection 0.5 mg, 0.5 mg, Intravenous, Q5 Min PRN, Denise Arguello CRNA  •  [MAR Hold] insulin lispro (humaLOG) injection 0-9 Units, 0-9 Units, Subcutaneous, 4x Daily With Meals & Nightly, Pamela Cronin MD  •  labetalol (NORMODYNE,TRANDATE) injection 5 mg, 5 mg, Intravenous, Q5 Min PRN, Denise Arguello CRNA  •  lactated ringers infusion, 9 mL/hr, Intravenous, Continuous, Paulina Munoz MD, Last Rate: 9 mL/hr at 05/02/19 0750, 9 mL/hr at 05/02/19 0750  •  naloxone (NARCAN) injection 0.2 mg, 0.2 mg, Intravenous, PRN, Denise Arguello CRNA  •  [MAR Hold] nitroglycerin (NITROSTAT) SL tablet 0.4 mg, 0.4 mg, Sublingual, Q5 Min PRN, Pamela rConin MD  •  [MAR Hold] ondansetron (ZOFRAN) injection 4 mg, 4 mg, Intravenous, Q6H PRN, Pamela Cronin MD  •  [MAR Hold] ondansetron (ZOFRAN) tablet 4 mg, 4 mg, Oral, Q6H PRN **OR** [MAR Hold] ondansetron (ZOFRAN) injection 4 mg, 4 mg, Intravenous, Q6H PRN, Tyrese Moody MD  •  ondansetron (ZOFRAN) injection 4 mg, 4 mg, Intravenous, Once PRN, Denise Arguello CRNA  •  oxyCODONE-acetaminophen (PERCOCET) 7.5-325 MG per tablet 1 tablet, 1 tablet, Oral, Once PRN, Denise Arguello CRNA  •  [MAR Hold] pravastatin (PRAVACHOL) tablet 40 mg, 40 mg, Oral, Daily, Pamela Cronin MD, 40 mg at 05/01/19 0852  •  promethazine (PHENERGAN) injection 12.5 mg, 12.5 mg, Intravenous, Once PRN **OR** promethazine (PHENERGAN) injection 12.5 mg, 12.5 mg, Intramuscular, Once PRN **OR** promethazine (PHENERGAN) suppository 25 mg, 25 mg, Rectal, Once PRN **OR** promethazine (PHENERGAN) tablet 25 mg, 25 mg, Oral, Once PRN, Denise Arguello CRNA  •  [MAR Hold] sodium chloride 0.9 % flush 3 mL, 3 mL, Intravenous, Q12H, Pamela Cronin MD, 3 mL at 05/01/19 2025  •  [MAR Hold] sodium chloride 0.9 %  flush 3-10 mL, 3-10 mL, Intravenous, PRN, Pamela Cronin MD  •  sodium chloride 0.9 % infusion, 75 mL/hr, Intravenous, Continuous, Tyrese Moody MD, Last Rate: 75 mL/hr at 05/02/19 0433, 75 mL/hr at 05/02/19 0433    Objective     Vital Signs  Temp:  [97.5 °F (36.4 °C)-98.1 °F (36.7 °C)] 97.5 °F (36.4 °C)  Heart Rate:  [] 78  Resp:  [16-18] 16  BP: ()/(55-76) 94/56  Body mass index is 22.81 kg/m².    Intake/Output Summary (Last 24 hours) at 5/2/2019 0922  Last data filed at 5/2/2019 0902  Gross per 24 hour   Intake 500 ml   Output 1000 ml   Net -500 ml     I/O this shift:  In: 500 [I.V.:500]  Out: -         Results Review:     I reviewed the patient's new clinical results.    Results from last 7 days   Lab Units 05/02/19  0602 05/01/19 0539 04/30/19 2024   WBC 10*3/mm3 10.74 12.68* 14.45*   HEMOGLOBIN g/dL 7.2* 8.3* 8.6*   HEMATOCRIT % 22.4* 26.6* 27.8*   PLATELETS 10*3/mm3 320 334 345     Results from last 7 days   Lab Units 05/02/19  0602 05/01/19 0539 04/30/19 2024   SODIUM mmol/L 141 137 136   POTASSIUM mmol/L 3.7 4.0 4.2   CHLORIDE mmol/L 110* 103 99   CO2 mmol/L 21.6* 23.2 24.2   BUN mg/dL 22 30* 34*   CREATININE mg/dL 1.21 1.47* 1.60*   CALCIUM mg/dL 9.9 10.6* 11.2*   BILIRUBIN mg/dL  --  0.5  --    ALK PHOS U/L  --  70  --    ALT (SGPT) U/L  --  13  --    AST (SGOT) U/L  --  16  --    GLUCOSE mg/dL 104* 116* 110*     Results from last 7 days   Lab Units 04/30/19 2024   INR  1.24*     No results found for: LIPASE    Radiology:  CT Head Without Contrast   Final Result   1. There is mild small vessel disease in the cerebral white matter and   mild cerebral atrophy. There are calcified atherosclerotic plaques in   the intracranial segments of the distal vertebral arteries and cavernous   segments of the internal carotid arteries bilaterally.   2. Partial opacification of the ethmoid and maxillary sinuses   bilaterally and the left sphenoid sinus with fluid and mucosal   thickening.   3. The  remainder of the head CT is normal.       Radiation dose reduction techniques were utilized, including automated   exposure control and exposure modulation based on body size.       This report was finalized on 5/1/2019 9:43 AM by Dr. Eliud Owens M.D.          CT Chest Without Contrast   Preliminary Result   1. There is bibasilar pneumonia with bronchial wall thickening, mucous   plugging and fine high density punctate foci. The findings are most   suggestive for aspiration. Additional areas of patchy ground glass   infiltrate are also seen within bilateral upper lobes and the right   middle lobe. There is suggestion of underlying bronchiectasis within the   bilateral lower lobes.   2. Small sliding hiatal hernia.   3. Multiple mediastinal lymph nodes favored to be reactive. Follow-up CT   chest is recommended in 3 months to reevaluate.       Radiation dose reduction techniques were utilized, including automated   exposure control and exposure modulation based on body size.              FL Video Swallow    (Results Pending)   FL Retrograde Pyelogram In OR    (Results Pending)       Assessment/Plan     Patient Active Problem List   Diagnosis   • Weight loss   • Aspiration pneumonia due to vomitus (CMS/HCC)   • Bladder mass   • Bladder mass   • Duodenitis   • Anemia   • Mediastinal lymphadenopathy   • Iron deficiency anemia due to chronic blood loss     Assessment:  1.   weight loss.  2.  abnormal CAT scan of the abdomen and pelvis suggesting a bladder mass with possibly metastatic disease  3.  duodenitis by CT-  takes omeprazole daily at home + baby asa  4.  pneumonia.  5.  anemia, iron deficiency       Recommendations:  - f/u stool Hemoccult.  - continue Protonix 40 mg p.o. Daily  -Given urologic findings and patient's endorsement of recent hematuria, endoscopic work-up is likely not necessary.  Agree with waiting for results of urologic work-up and family decision regarding management before proceeding with any  additional testing.          Deloris De Dios MD

## 2019-05-02 NOTE — PROGRESS NOTES
"    DAILY PROGRESS NOTE  [facility]    Patient Identification:  Name: Modesto Diego  Age: 78 y.o.  Sex: male  :  1940  MRN: [id number]         Primary Care Physician: Mg Root MD    Subjective:  Interval History: Called by Dr. Harris this morning.  Secondary to conversation we did not place any nephrostomy tube.  Now with bladder irrigation.  Patient currently denies any confusion chest pain nausea or vomiting.  He started to notice more abdominal discomfort    Objective: Spouse at bedside as was RN    Scheduled Meds:  cefOXitin 2 g Intravenous Q8H   famotidine 20 mg Intravenous Q12H   insulin lispro 0-9 Units Subcutaneous 4x Daily With Meals & Nightly   pravastatin 40 mg Oral Daily   sodium chloride 3 mL Intravenous Q12H     Continuous Infusions:  sodium chloride 75 mL/hr Last Rate: 75 mL/hr (19 0859)       Vital signs in last 24 hours:  Temp:  [97.5 °F (36.4 °C)-98.4 °F (36.9 °C)] 97.9 °F (36.6 °C)  Heart Rate:  [] 87  Resp:  [14-18] 16  BP: ()/(55-76) 128/75    Intake/Output:    Intake/Output Summary (Last 24 hours) at 2019 1347  Last data filed at 2019 1216  Gross per 24 hour   Intake 800 ml   Output 2250 ml   Net -1450 ml       Exam:  /75   Pulse 87   Temp 97.9 °F (36.6 °C) (Oral)   Resp 16   Ht 177.8 cm (70\")   Wt 72.1 kg (159 lb)   SpO2 95%   BMI 22.81 kg/m²     General Appearance:    Alert, cooperative, no distress, AAOx3                         Throat:   Lips, tongue, gums normal; oral mucosa pink and moist                           Neck:   Supple, symmetrical, trachea midline, no JVD                         Lungs:    Decreased bases otherwise clear to auscultation bilaterally, respirations unlabored                 Chest Wall:    No tenderness or deformity                          Heart:    Regular rate and rhythm, S1 and S2 normal                  Abdomen:     Soft, abdominal discomfort but no rebound or guarding, bowel sounds active "                  Extremities:   Extremities normal, atraumatic, no cyanosis or edema                        Pulses:   Pulses palpable in all extremities                  Neurologic:   CNII-XII intact, moving all without focal deficits noted     Data Review:  Labs in chart were reviewed.    Assessment:  Active Hospital Problems    Diagnosis  POA   • **Aspiration pneumonia due to vomitus (CMS/HCC) [J69.0]  Unknown   • Mediastinal lymphadenopathy [R59.0]  Unknown   • Iron deficiency anemia due to chronic blood loss [D50.0]  Unknown   • Weight loss [R63.4]  Unknown   • Bladder mass [N32.89]  Unknown   • Bladder mass [N32.89]  Unknown   • Duodenitis [K29.80]  Unknown   • Anemia [D64.9]  Unknown      Resolved Hospital Problems   No resolved problems to display.       Plan:  Case discussed with Dr. Harris this morning.  Patient's mass is significant and this most likely is an aggressive bladder cancer that is already moved to surrounding lymph nodes.    Case discussed at length with patient as well as spouse at bedside.  Must have spent greater than 30 minutes counseling alone.    He seems to understand all that is going on but is having a tough time dealing with this secondary to financial issues that he still feels are open ended.  We have discussed CODE STATUS and he is hesitant at times to want to transition code to DNR.    Ultimately we are awaiting patient's final decision over the next 24 hours on which way he would like to proceed with this.  I personally support keeping the patient more comfortable and focusing on home with hospice.    In the meantime I will ask hospice to come by and evaluate and provide any additional input to patient and spouse    At this juncture we will hold ALL further testing whether be a video swallow study or any forms of endoscopy.  Give the patient and his wife time to consider what they would like to do and will proceed from that point    Tyrese Moody MD  5/2/2019  1:47 PM

## 2019-05-02 NOTE — ANESTHESIA POSTPROCEDURE EVALUATION
Patient: Modesto Diego    Procedure Summary     Date:  05/02/19 Room / Location:  [facility] OR 01 / [facility] MAIN OR    Anesthesia Start:  0802 Anesthesia Stop:  0902    Procedure:  CYSTOSCOPY TRANSURETHRAL RESECTION OF BLADDER TUMOR AND LEFT RETROGRADE PYLOGRAM (N/A ) Diagnosis:       Bladder cancer (CMS/HCC)      (Bladder mass [N32.89])    Surgeon:  Gregorio Harris MD Provider:  Gregorio Guillen MD    Anesthesia Type:  general ASA Status:  3          Anesthesia Type: general  Last vitals  BP   116/67 (05/02/19 0950)   Temp   36.4 °C (97.5 °F) (05/02/19 0859)   Pulse   73 (05/02/19 0950)   Resp   14 (05/02/19 0950)     SpO2   100 % (05/02/19 0950)     Post Anesthesia Care and Evaluation    Patient location during evaluation: PACU  Patient participation: complete - patient participated  Level of consciousness: awake and alert  Pain management: adequate  Airway patency: patent  Anesthetic complications: No anesthetic complications    Cardiovascular status: acceptable  Respiratory status: acceptable  Hydration status: acceptable    Comments: --------------------            05/02/19 0950     --------------------   BP:       116/67     Pulse:      73       Resp:       14       Temp:                SpO2:      100%     --------------------

## 2019-05-02 NOTE — OP NOTE
CYSTOSCOPY TRANSURETHRAL RESECTION OF BLADDER TUMOR  Procedure Note    Modesto Diego  4/30/2019 - 5/2/2019    Pre-op Diagnosis:   Bladder mass [N32.89]    Post-op Diagnosis:     Post-Op Diagnosis Codes:     * Bladder cancer (CMS/HCC) [C67.9]    Procedure(s):  CYSTOSCOPY TRANSURETHRAL RESECTION OF BLADDER TUMOR AND LEFT RETROGRADE PYLOGRAM    Surgeon(s):  Gregorio Harris MD    Anesthesia: General    Staff:   Circulator: Ebonie Lopez RN  Radiology Technologist: Svetlana Gaspar  Scrub Person: Sonali Garza  Other: Nhi Clemens    Estimated Blood Loss: minimal    Specimens:                  Order Name Source Comment Collection Info Order Time   TISSUE PATHOLOGY EXAM Urinary Bladder  Collected By: Gregorio Harris MD 5/2/2019  8:45 AM         Drains:   Urethral Catheter Other (Comment) (Active)   Daily Indications Selected surgeries ( tract, abdomen) 5/2/2019  8:59 AM   Site Assessment Clean 5/2/2019  8:59 AM   Collection Container Standard drainage bag 5/2/2019  8:59 AM   Securement Method Securing device 5/2/2019  8:59 AM   Irrigation/Instillation Indication patency maintenance 5/2/2019  8:59 AM   Irrigation Ease no resistance met 5/2/2019  8:59 AM       Findings: Large necrotic bladder tumor involving right lateral wall and base with obstruction of the right ureter, unable to identify the right ureteral orifice due to invasive carcinoma    Complications: None    Indications: 78-year-old gentleman presents with weight loss malaise and generalized debilitation.  Imaging shows a large mass in his bladder with severe right hydronephrosis he does relate a history of gross hematuria intermittently for many months now.    Procedure: [facility].  Placed in lithotomy.  Prepped and draped in sterile fashion.  Cystoscopy was performed.  Urethra is normal.  Prostatic urethra showed moderate lateral lobe hyperplasia.  The bladder stone was visualized.  Severe trabeculation  was noted.  The left orifice was identified.  Retrograde pyelogram was performed which showed a normal caliber ureter normal pelvis and calyces.  Large tumor with necrotic components was seen involving the right lateral wall base completely deforming the right hemitrigone.  Using the loop resectoscope I then resected down a lot of the necrotic tumor and try to resect down to the base of the right side the bladder hoping to find the orifice but this was unsuccessful.  I resected down about as much as I felt comfortable and then irrigated all the necrotic debris out.  Some of the tumor was sent off for pathological diagnosis.  A three-way catheter was placed to continuous irrigation.  Clearly this is an unresectable carcinoma.  Discussed the findings with his wife.  We will attempt to do an nephrostomy tube today if the patient allows.  The decision will be at this point whether he consents to a palliative cystectomy with ileal conduit diversion and then chemotherapy.  I think the likelihood of a cure is extremely low for this stage disease but fortunately there is no obvious metastasis at this point but will need to work this up before we consider surgery if he consents.      Gregorio Harris MD     Date: 5/2/2019  Time: 9:34 AM

## 2019-05-02 NOTE — SIGNIFICANT NOTE
05/02/19 1018   Rehab Treatment   Discipline physical therapy assistant   Reason Treatment Not Performed unavailable for treatment  (Pt on hold today in OR and SUE Jenkins asked for pt to be checked back on tomorrow)   Recommendation   PT - Next Appointment 05/03/19

## 2019-05-02 NOTE — SIGNIFICANT NOTE
05/02/19 1542   Rehab Time/Intention   Evaluation Not Performed other (see comments)  (Per MD, VFSS being held at this time until patient determines goals of care. Please re consult if/when video swallow is warranted.)   Rehab Treatment   Discipline speech language pathologist

## 2019-05-02 NOTE — PERIOPERATIVE NURSING NOTE
Hgb 7.2 today.  Type & screen obtained per orders and sent to lab.  Per Dr. Harris, transfusion can start after surgery this a.m.Informed staff nurse on 5E.  Informed PACU.

## 2019-05-03 NOTE — PLAN OF CARE
Met with pt and spouse at bedside and provided explanation of services. Pt goals are to seek any treatment options available. Pt stated he wants to live as long as possible for his grandchildren. Goals do not align with hospice care. [facility] as he is able to seek treatment. [facility] palliative care will follow up with pt and spouse to provide further explanation of services when pt is at home. Encouraged pt and spouse to call with any questions about [facility] and/or if goals change.   Thank you for the referral.  Kayla Mendez RN  [facility]  [phone]

## 2019-05-03 NOTE — THERAPY TREATMENT NOTE
Acute Care - Physical Therapy Treatment Note  [facility]     Patient Name: Modesto Diego  : 1940  MRN: [id number]  Today's Date: 5/3/2019  Onset of Illness/Injury or Date of Surgery: 19     Referring Physician: Fransisco    Admit Date: 2019    Visit Dx:    ICD-10-CM ICD-9-CM   1. Bladder mass N32.89 596.89   2. Unsteadiness on feet R26.81 781.2     Patient Active Problem List   Diagnosis   • Weight loss   • Aspiration pneumonia due to vomitus (CMS/HCC)   • Bladder mass   • Bladder mass   • Duodenitis   • Anemia   • Mediastinal lymphadenopathy   • Iron deficiency anemia due to chronic blood loss       Therapy Treatment    Rehabilitation Treatment Summary     Row Name 19 1000             Treatment Time/Intention    Discipline  physical therapy assistant  -CW      Document Type  therapy note (daily note)  -CW      Subjective Information  no complaints  -CW      Mode of Treatment  physical therapy;individual therapy  -CW      Therapy Frequency (PT Clinical Impression)  daily  -CW      Patient Effort  good  -CW      Existing Precautions/Restrictions  fall  -CW      Recorded by [CW] Arsalan Khan PTA 19 1017      Row Name 19 1000             Vital Signs    O2 Delivery Pre Treatment  room air  -CW      Recorded by [CW] Arsalan Khan PTA 19 1017      Row Name 19 1000             Cognitive Assessment/Intervention- PT/OT    Orientation Status (Cognition)  oriented x 4  -CW      Follows Commands (Cognition)  WNL  -CW      Personal Safety Interventions  fall prevention program maintained;gait belt;muscle strengthening facilitated;nonskid shoes/slippers when out of bed  -CW      Recorded by [CW] Arsalan Khan PTA 19 1017      Row Name 19 1000             Safety Issues, Functional Mobility    Impairments Affecting Function (Mobility)  balance;endurance/activity tolerance;strength  -CW      Recorded by [CW] Arsalan Khan, PTA 19 1017       Row Name 05/03/19 1000             Bed Mobility Assessment/Treatment    Bed Mobility Assessment/Treatment  supine-sit;sit-supine  -CW      Supine-Sit [place] (Bed Mobility)  supervision;verbal cues  -CW      Sit-Supine [place]lock (Bed Mobility)  supervision  -CW      Assistive Device (Bed Mobility)  bed rails  -CW      Recorded by [CW] Arsalan Khan, PTA 05/03/19 1017      Row Name 05/03/19 1000             Transfer Assessment/Treatment    Transfer Assessment/Treatment  sit-stand transfer;stand-sit transfer  -CW      Recorded by [CW] Arsalan Khan, PTA 05/03/19 1017      Row Name 05/03/19 1000             Sit-Stand Transfer    Sit-Stand Gunlock (Transfers)  contact guard;verbal cues  -CW      Assistive Device (Sit-Stand Transfers)  walker, front-wheeled  -CW      Recorded by [CW] Arsalan Khan, PTA 05/03/19 1017      Row Name 05/03/19 1000             Stand-Sit Transfer    Stand-Sit Gunlock (Transfers)  contact guard;verbal cues  -CW      Recorded by [CW] Arsalan Khan, PTA 05/03/19 1017      Row Name 05/03/19 1000             Gait/Stairs Assessment/Training    Gunlock Level (Gait)  contact guard;verbal cues  -CW      Assistive Device (Gait)  walker, front-wheeled  -CW      Distance in Feet (Gait)  180  -CW      Pattern (Gait)  step-through  -CW      Deviations/Abnormal Patterns (Gait)  ronal decreased;stride length decreased  -CW      Bilateral Gait Deviations  forward flexed posture  -CW      Comment (Gait/Stairs)  Pt with 3 minor LOB when amb with RWX  -CW      Recorded by [CW] Arsalan Khan, PTA 05/03/19 1017      Row Name 05/03/19 1000             Therapeutic Exercise    Lower Extremity (Therapeutic Exercise)  gluteal sets;LAQ (long arc quad), bilateral;marching while seated  -CW      Lower Extremity Range of Motion (Therapeutic Exercise)  ankle dorsiflexion/plantar flexion, bilateral  -CW      Exercise Type (Therapeutic Exercise)  AROM (active range of  motion)  -CW      Position (Therapeutic Exercise)  seated  -CW      Sets/Reps (Therapeutic Exercise)  10  -CW      Recorded by [CW] Arsalan Khan, PTA 05/03/19 1017      Row Name 05/03/19 1000             Positioning and Restraints    Pre-Treatment Position  in bed  -CW      Post Treatment Position  bed  -CW      In Bed  notified nsg;supine;call light within reach;encouraged to call for assist;exit alarm on;with family/caregiver  -CW      Recorded by [CW] Arsalan Khan, PTA 05/03/19 1017      Row Name 05/03/19 1000             Pain Scale: Numbers Pre/Post-Treatment    Pain Scale: Numbers, Pretreatment  0/10 - no pain  -CW      Pain Scale: Numbers, Post-Treatment  0/10 - no pain  -CW      Recorded by [CW] Arsalan Khan, PTA 05/03/19 1017      Row Name 05/03/19 1000             Outcome Summary/Treatment Plan (PT)    Anticipated Discharge Disposition (PT)  home with assist;home with home health  -CW      Recorded by [CW] Arsalan Khan, PTA 05/03/19 1017        User Key  (r) = Recorded By, (t) = Taken By, (c) = Cosigned By    Initials Name Effective Dates Discipline    CW Arsalan Khan, PTA 03/07/18 -  PT                   Physical Therapy Education     Title: PT OT SLP Therapies (Done)     Topic: Physical Therapy (Done)     Point: Mobility training (Done)     Learning Progress Summary           Patient Acceptance, E,TB, VU,DU by  at 5/3/2019 10:18 AM    Acceptance, E,TB,D, VU,NR by EE at 5/1/2019  1:57 PM                   Point: Home exercise program (Done)     Learning Progress Summary           Patient Acceptance, E,TB, VU,DU by CW at 5/3/2019 10:18 AM    Acceptance, E,TB,D, VU,NR by EE at 5/1/2019  1:57 PM                   Point: Body mechanics (Done)     Learning Progress Summary           Patient Acceptance, E,TB, VU,DU by CW at 5/3/2019 10:18 AM    Acceptance, E,TB,D, VU,NR by EE at 5/1/2019  1:57 PM                   Point: Precautions (Done)     Learning Progress Summary            Patient Acceptance, E,TB, VU,DU by CW at 5/3/2019 10:18 AM                               User Key     Initials Effective Dates Name Provider Type Discipline    EE 04/03/18 -  Umu Monroy, PT Physical Therapist PT    CW 03/07/18 -  Arsalan Khan, SHELLY Physical Therapy Assistant PT                PT Recommendation and Plan  Anticipated Discharge Disposition (PT): home with assist, home with home health  Therapy Frequency (PT Clinical Impression): daily  Outcome Summary/Treatment Plan (PT)  Anticipated Discharge Disposition (PT): home with assist, home with home health  Plan of Care Reviewed With: patient  Progress: improving  Outcome Summary: Pt increasing with strength and balance with use of RWX and improved gait safety with RWX.  Outcome Measures     Row Name 05/03/19 1000 05/01/19 1300          How much help from another person do you currently need...    Turning from your back to your side while in flat bed without using bedrails?  4  -CW  4  -EE     Moving from lying on back to sitting on the side of a flat bed without bedrails?  4  -CW  3  -EE     Moving to and from a bed to a chair (including a wheelchair)?  3  -CW  3  -EE     Standing up from a chair using your arms (e.g., wheelchair, bedside chair)?  3  -CW  3  -EE     Climbing 3-5 steps with a railing?  2  -CW  2  -EE     To walk in hospital room?  3  -CW  3  -EE     AM-PAC 6 Clicks Score  19  -CW  18  -EE        Functional Assessment    Outcome Measure Options  AM-PAC 6 Clicks Basic Mobility (PT)  -CW  AM-PAC 6 Clicks Basic Mobility (PT)  -EE       User Key  (r) = Recorded By, (t) = Taken By, (c) = Cosigned By    Initials Name Provider Type    EE Umu Monroy, PT Physical Therapist    CW Arsalan Khan, SHELLY Physical Therapy Assistant         Time Calculation:   PT Charges     Row Name 05/03/19 1019             Time Calculation    Start Time  1000  -CW      Stop Time  1019  -CW      Time Calculation (min)  19 min  -CW      PT Received On   05/03/19  -CHARI      PT - Next Appointment  05/04/19  -CHARI        User Key  (r) = Recorded By, (t) = Taken By, (c) = Cosigned By    Initials Name Provider Type    Arsalan Esposito PTA Physical Therapy Assistant        Therapy Charges for Today     Code Description Service Date Service Provider Modifiers Qty    02553614074 HC PT THER PROC EA 15 MIN 5/3/2019 Arsalan Khan PTA GP 1    18020841892 HC PT THER SUPP EA 15 MIN 5/3/2019 Arsalan Khan PTA GP 1          PT G-Codes  Outcome Measure Options: AM-PAC 6 Clicks Basic Mobility (PT)  AM-PAC 6 Clicks Score: 19    Arsalan Khan PTA  5/3/2019

## 2019-05-03 NOTE — PROGRESS NOTES
"  First Urology Progress Note    Chief Complaint: Bladder pain    Fairly comfortable at this point.  Catheter is obviously bothersome to him.  Catheter is draining well though.  Very little gross hematuria this morning.  We long discussion regarding the findings yesterday cystoscopy.  I explained to him that he is got very aggressive bulky bladder cancer that is unresectable with the cystoscope but a palliative cystectomy could be considered.  The decision would be is whether it is worth putting him through that and then even doing any type of postoperative chemotherapy or even neoadjuvant chemotherapy would have any role.  He is very focused on issues at his home with finances and he has no end-of-life arrangements nor has he declared a healthcare surrogate.  Is still a full code at this point.  He would like to go ahead and at least getting nephrostomy tube in and then hopefully get home and make a decision at some point.  My impression with this conversation is that he is shunned health care in the past and likely will continue to do so in the future.  On discussion with him of these last 6 months it is clear that he has had significant weight loss and that he has had intermittent hematuria that he admits to.    Review of Systems:    The following systems were reviewed and negative;  cardiovascular, gastrointestinal, musculoskeletal and neurological          Vital Signs  /73 (BP Location: Left arm, Patient Position: Lying)   Pulse 74   Temp 97.9 °F (36.6 °C) (Oral)   Resp 18   Ht 177.8 cm (70\")   Wt 72.1 kg (159 lb)   SpO2 94%   BMI 22.81 kg/m²     Physical Exam:   Abdomen is benign.  Bey catheter anchored.  Testes normal.     Results Review:     I reviewed the patient's new clinical results.  Lab Results (last 24 hours)     Procedure Component Value Units Date/Time    POC Glucose Once [[id number]]  (Normal) Collected:  05/03/19 0731    Specimen:  Blood Updated:  05/03/19 0735     Glucose 96 mg/dL  "    POC Glucose Once [[id number]]  (Abnormal) Collected:  05/02/19 2101    Specimen:  Blood Updated:  05/02/19 2102     Glucose 156 mg/dL     Blood Culture - Blood, Hand, Right [[id number]] Collected:  04/30/19 2024    Specimen:  Blood from Hand, Right Updated:  05/02/19 2046     Blood Culture No growth at 2 days    Blood Culture - Blood, Hand, Right [[id number]] Collected:  04/30/19 2024    Specimen:  Blood from Hand, Right Updated:  05/02/19 2046     Blood Culture No growth at 2 days    POC Glucose Once [[id number]]  (Normal) Collected:  05/02/19 1837    Specimen:  Blood Updated:  05/02/19 1839     Glucose 122 mg/dL     POC Glucose Once [[id number]]  (Normal) Collected:  05/02/19 1152    Specimen:  Blood Updated:  05/02/19 1203     Glucose 111 mg/dL     Tissue Pathology Exam [[id number]] Collected:  05/02/19 0828    Specimen:  Tissue from Urinary Bladder Updated:  05/02/19 1026        Imaging Results (last 24 hours)     Procedure Component Value Units Date/Time    FL Retrograde Pyelogram In OR [[id number]] Collected:  05/02/19 0939     Updated:  05/02/19 1327    Narrative:       RETROGRADE PYELOGRAM     HISTORY: Hematuria. Large bladder mass and right renal obstruction.     Retrograde imaging shows contrast filling the left ureter and intrarenal  collecting system and no significant abnormality seen on the left. The  right collecting system is not imaged. One image was obtained and the  fluoroscopy time measures 6 seconds.     This report was finalized on 5/2/2019 1:23 PM by Dr. Octavio Ramirez M.D.             Medication Review:   I have personally reviewed    Current Facility-Administered Medications:   •  acetaminophen (TYLENOL) tablet 650 mg, 650 mg, Oral, Q4H PRN, Pamela Cronin MD  •  ceFOXITin (MEFOXIN) 2 g in 100 mL 0.9% Sodium Chloride IVPB ( ASHLIE), 2 g, Intravenous, Q8H, Tyrese Moody MD, 2 g at 05/03/19 0614  •  dextrose (D50W) 25 g/ 50mL Intravenous Solution 25 g, 25 g, Intravenous, Q15 Min PRN, Mehrdad,  MD Pamela  •  dextrose (GLUTOSE) oral gel 15 g, 15 g, Oral, Q15 Min PRN, Pamela Cronin MD  •  famotidine (PEPCID) injection 20 mg, 20 mg, Intravenous, Q12H, Tyrese Moody MD, 20 mg at 05/03/19 0851  •  glucagon (human recombinant) (GLUCAGEN DIAGNOSTIC) injection 1 mg, 1 mg, Subcutaneous, PRN, Pamela Cronin MD  •  HYDROcodone-acetaminophen (NORCO) 7.5-325 MG per tablet 1 tablet, 1 tablet, Oral, Q6H PRN, Tyrese Moody MD  •  HYDROmorphone (DILAUDID) injection 0.5 mg, 0.5 mg, Intravenous, Q2H PRN **AND** naloxone (NARCAN) injection 0.1 mg, 0.1 mg, Intravenous, Q5 Min PRN, Gregorio Harris MD  •  insulin lispro (humaLOG) injection 0-9 Units, 0-9 Units, Subcutaneous, 4x Daily With Meals & Nightly, Pamela Cronin MD  •  nitroglycerin (NITROSTAT) SL tablet 0.4 mg, 0.4 mg, Sublingual, Q5 Min PRN, Pamela Cronin MD  •  ondansetron (ZOFRAN) injection 4 mg, 4 mg, Intravenous, Q6H PRN, Pamela Cronin MD  •  ondansetron (ZOFRAN) tablet 4 mg, 4 mg, Oral, Q6H PRN **OR** ondansetron (ZOFRAN) injection 4 mg, 4 mg, Intravenous, Q6H PRN, Tyrese Moody MD  •  oxyCODONE-acetaminophen (PERCOCET) 7.5-325 MG per tablet 1 tablet, 1 tablet, Oral, Q4H PRN, Gregorio Harris MD  •  pravastatin (PRAVACHOL) tablet 40 mg, 40 mg, Oral, Daily, Pamela Cronin MD, 40 mg at 05/03/19 0850  •  sodium chloride 0.9 % flush 3 mL, 3 mL, Intravenous, Q12H, Pamela Cronin MD, 3 mL at 05/03/19 0850  •  sodium chloride 0.9 % flush 3-10 mL, 3-10 mL, Intravenous, PRN, Pamela Cronin MD    Allergies:    Amoxicillin    Assessment:    Active Problems:  Patient Active Problem List   Diagnosis   • Weight loss   • Aspiration pneumonia due to vomitus (CMS/HCC)   • Bladder mass   • Bladder mass   • Duodenitis   • Anemia   • Mediastinal lymphadenopathy   • Iron deficiency anemia due to chronic blood loss       Locally invasive bladder cancer with right hydronephrosis but no clear extravesical disease on current imaging    Plan:    We will place a  nephrostomy tube today and likely let him go home to make his decision on what he wants to do.  I would like to get the medical oncology input on any role of chemotherapy in this gentleman.  Probably at 78 years of age the best thing for him would be to attempt to internalize a stent on the right side and remove his nephrostomy tube and then let him go to hospice.  My impression is that he probably will not want to have surgery or chemotherapy.      Gregorio Harris MD    5/3/2019  9:49 AM

## 2019-05-03 NOTE — PLAN OF CARE
Problem: Patient Care Overview  Goal: Plan of Care Review  Outcome: Ongoing (interventions implemented as appropriate)   05/03/19 1018   Coping/Psychosocial   Plan of Care Reviewed With patient   Plan of Care Review   Progress improving   OTHER   Outcome Summary Pt increasing with strength and balance with use of RWX and improved gait safety with RWX.

## 2019-05-03 NOTE — PLAN OF CARE
Problem: Patient Care Overview  Goal: Plan of Care Review  Outcome: Ongoing (interventions implemented as appropriate)   05/02/19 0211 05/03/19 0513   Coping/Psychosocial   Plan of Care Reviewed With --  patient   Plan of Care Review   Progress improving --    OTHER   Outcome Summary --  vss this pm; pt without complaint of pain or discomfort; bladder irrigation continues with clear yellow urine; pt and wife have both decided to pursue treatment options for recent cancer diagnosis; will continue to monitor     Goal: Individualization and Mutuality  Outcome: Ongoing (interventions implemented as appropriate)    Goal: Discharge Needs Assessment  Outcome: Ongoing (interventions implemented as appropriate)      Problem: Pneumonia (Adult)  Goal: Signs and Symptoms of Listed Potential Problems Will be Absent, Minimized or Managed (Pneumonia)  Outcome: Ongoing (interventions implemented as appropriate)      Problem: Urine Elimination Impaired (Adult)  Goal: Reduced Incontinence Episodes  Outcome: Ongoing (interventions implemented as appropriate)      Problem: Fall Risk (Adult)  Goal: Identify Related Risk Factors and Signs and Symptoms  Outcome: Ongoing (interventions implemented as appropriate)    Goal: Absence of Fall  Outcome: Ongoing (interventions implemented as appropriate)      Problem: Skin Injury Risk (Adult)  Goal: Identify Related Risk Factors and Signs and Symptoms  Outcome: Ongoing (interventions implemented as appropriate)    Goal: Skin Health and Integrity  Outcome: Ongoing (interventions implemented as appropriate)      Problem: Nutrition, Imbalanced: Inadequate Oral Intake (Adult)  Goal: Improved Oral Intake  Outcome: Ongoing (interventions implemented as appropriate)    Goal: Prevent Further Weight Loss  Outcome: Ongoing (interventions implemented as appropriate)

## 2019-05-03 NOTE — PROGRESS NOTES
Discussed case with oncology Dr. Castro -transfusing 2 units and will see in consultation in a.m.    Went to see patient but spouse at bedside states that he elected to proceed with removal of the bladder and aggressive surgically management    The above is actually incorrect as patient is actually down in radiology as I discussed the case with the radiologist and temporary nephrostomy tubes were placed.  Unfortunately it could have been a complication of pneumothorax and a chest x-ray was ordered and is pending    Per spouse, Patient not wanting to consider thoughts of palliation and wants to go with full medical and surgical management at this time per discussion with spouse    I was unable to see the patient today appreciate surgical assistance per Dr. Harris as well as Rads     So discussed with Dr. De Dios who will sign off of the case at this juncture as we no longer plan to perform any type of endoscopy

## 2019-05-03 NOTE — PROGRESS NOTES
Case discussed with Dr. Moody with [facility].  We were consulted for weight loss and anemia.  Given urologic findings and additional patient history of ongoing outpatient hematuria, no reason to pursue endoscopic evaluation at this time.  He is currently undergoing cystectomy.  We will sign off but are available as needed.

## 2019-05-03 NOTE — PROGRESS NOTES
Continued Stay Note  [facility]     Patient Name: Modesto Diego  MRN: [id number]  Today's Date: 5/3/2019    Admit Date: 4/30/2019    Discharge Plan     Row Name 05/03/19 1437       Plan    Plan  Return home with wife    Patient/Family in Agreement with Plan  yes    Plan Comments  Spoke with patient and wife at bedside.  Patient still with [facility].  Wife was given [facility], list of  agencies.  CCP will continue to follow.  BHumeniuk RN Becky S. Humeni[place], RN

## 2019-05-04 NOTE — PROGRESS NOTES
"    DAILY PROGRESS NOTE  [facility]    Patient Identification:  Name: Modesto Diego  Age: 78 y.o.  Sex: male  :  1940  MRN: [id number]         Primary Care Physician: Mg Root MD    Subjective:  Interval History: Patient is a little bit confused today.  He was resting upon entering the room but was easy to arouse definitely not as good as he was yesterday.  He just received a Percocet not long ago and probably has some aspects of some toxic encephalopathy persisting from anesthesia yesterday.  He is not complaining of anything new such as nausea vomiting chest pain abdominal pain or shortness of breath    Objective: Case discussed with spouse    Scheduled Meds:  cefOXitin 2 g Intravenous Q8H   famotidine 20 mg Intravenous Q12H   insulin lispro 0-9 Units Subcutaneous 4x Daily With Meals & Nightly   pravastatin 40 mg Oral Daily   sodium chloride 3 mL Intravenous Q12H     Continuous Infusions:     Vital signs in last 24 hours:  Temp:  [97.7 °F (36.5 °C)-98.3 °F (36.8 °C)] 97.9 °F (36.6 °C)  Heart Rate:  [72-89] 72  Resp:  [16-20] 20  BP: (116-145)/(66-80) 124/73    Intake/Output:    Intake/Output Summary (Last 24 hours) at 2019 1152  Last data filed at 2019 0658  Gross per 24 hour   Intake 120 ml   Output 1290 ml   Net -1170 ml       Exam:  /73 (BP Location: Left arm, Patient Position: Lying)   Pulse 72   Temp 97.9 °F (36.6 °C) (Oral)   Resp 20   Ht 177.8 cm (70\")   Wt 72.1 kg (159 lb)   SpO2 94%   BMI 22.81 kg/m²     General Appearance:    Alerts easily but some very mild underlying confusion, cooperative, no distress, nontoxic appearing                         Throat:   Lips, tongue, gums normal; oral mucosa pink and moist                           Neck:   Supple, symmetrical, trachea midline, no JVD                         Lungs:    Clear to auscultation bilaterally, respirations unlabored                 Chest Wall:    No tenderness or deformity                 "          Heart:    Regular rate and rhythm, S1 and S2 normal                  Abdomen:     Soft, non-tender, bowel sounds active                   Extremities: Grossly moving all, no cyanosis or edema                        Pulses:   Pulses palpable in all extremities                           Data Review:  Labs in chart were reviewed.    Assessment:  Active Hospital Problems    Diagnosis  POA   • **Aspiration pneumonia due to vomitus (CMS/HCC) [J69.0]  Unknown   • Mediastinal lymphadenopathy [R59.0]  Unknown   • Iron deficiency anemia due to chronic blood loss [D50.0]  Unknown   • Weight loss [R63.4]  Unknown   • Bladder mass [N32.89]  Unknown   • Bladder mass [N32.89]  Unknown   • Duodenitis [K29.80]  Unknown   • Anemia [D64.9]  Unknown      Resolved Hospital Problems   No resolved problems to display.       Plan:  POD 1 nephrostomy tube.  Discussed with radiology as there was concern for possible lung involvement though chest x-ray does not demonstrate any aspects of a pneumothorax    REANNA secondary to chronic blood loss secondary to the above tumor/Anema chronic disease/probable malignancy   -Status post transfusion pRBCs - hemoglobin 9.8 today   -Status post IV Venofer 300 mg    Patient more confused today from anesthesia and pain medication.  No further plans for comfort noted per yesterday though I would like to further discussed with the patient's wishes are going forward as I anticipate him to get back to baseline in the next 24 hours.    Case discussed with oncologist Dr. Castro and I appreciate his input    Dr. Harris with urology to return to the case on Monday and ultimately patient will give final recommendations as to his wishes for aggressive surgical intervention as he does not appear to want to embrace a hospice like picture    Tyrese Moody MD  5/4/2019  11:52 AM

## 2019-05-04 NOTE — PROGRESS NOTES
POD 2 cystoscopy and TURBT 5/2/19  POD 1 Right neph tube placement    VSSA  NAD  A&O x3  Wife at bedside  Neph tube intact, yellow fluid, 65cc out  Pt spontaneously voiding, denies hematuria.  Had to have straight cath x1 yesterday but voiding has increased  Pain is controlled    Impression:  Bladder cancer   Right renal obstruction s/p right neph tube     Plan:  Will check am labs  Check PVR if >250 will need straight cath or rasheed anchored    If discharged home will need outpatient appointment with Dr. Karel Harris, [facility], [phone] this week to further discuss bladder cancer treatment plan.

## 2019-05-04 NOTE — THERAPY TREATMENT NOTE
Acute Care - Physical Therapy Treatment Note  [facility]     Patient Name: Modesto Diego  : 1940  MRN: [id number]  Today's Date: 2019  Onset of Illness/Injury or Date of Surgery: 19     Referring Physician: Fransisco    Admit Date: 2019    Visit Dx:    ICD-10-CM ICD-9-CM   1. Bladder mass N32.89 596.89   2. Unsteadiness on feet R26.81 781.2     Patient Active Problem List   Diagnosis   • Weight loss   • Aspiration pneumonia due to vomitus (CMS/HCC)   • Bladder mass   • Bladder mass   • Duodenitis   • Anemia   • Mediastinal lymphadenopathy   • Iron deficiency anemia due to chronic blood loss       Therapy Treatment    Rehabilitation Treatment Summary     Row Name 19 1556             Treatment Time/Intention    Discipline  physical therapist  -CS      Document Type  therapy note (daily note)  -CS      Subjective Information  no complaints  -CS      Mode of Treatment  physical therapy;individual therapy  -CS      Patient/Family Observations  Pt supine in bed, no acute distress. Wife present  -CS      Therapy Frequency (PT Clinical Impression)  daily  -CS      Patient Effort  good  -CS      Existing Precautions/Restrictions  fall  -CS      Recorded by [CS] Isma Perry, PT 19 155      Row Name 19 1556             Cognitive Assessment/Intervention- PT/OT    Orientation Status (Cognition)  oriented x 4  -CS      Follows Commands (Cognition)  WNL  -CS      Personal Safety Interventions  fall prevention program maintained;gait belt;nonskid shoes/slippers when out of bed;supervised activity  -CS      Recorded by [CS] Isma Perry, PT 19      Row Name 19 1556             Bed Mobility Assessment/Treatment    Bed Mobility Assessment/Treatment  supine-sit;sit-supine  -CS      Supine-Sit [place] (Bed Mobility)  supervision;verbal cues  -CS      Sit-Supine [place] (Bed Mobility)  supervision  -CS      Assistive Device (Bed Mobility)  bed rails  -CS       Recorded by [CS] Isma Perry, PT 05/04/19 1558      Row Name 05/04/19 1556             Transfer Assessment/Treatment    Transfer Assessment/Treatment  sit-stand transfer;stand-sit transfer;toilet transfer  -CS      Recorded by [CS] Isma Perry, PT 05/04/19 1558      Row Name 05/04/19 1556             Sit-Stand Transfer    Sit-Stand [place] (Transfers)  contact guard;verbal cues  -CS      Assistive Device (Sit-Stand Transfers)  walker, front-wheeled  -CS      Recorded by [CS] Isma Perry, PT 05/04/19 1558      Row Name 05/04/19 1556             Stand-Sit Transfer    Stand-Sit [place] (Transfers)  contact guard;verbal cues  -CS      Recorded by [CS] Isma Perry, PT 05/04/19 1558      Row Name 05/04/19 1556             Toilet Transfer    Type (Toilet Transfer)  stand-sit;sit-stand  -CS      [place] Level (Toilet Transfer)  contact guard  -CS      Recorded by [CS] Isma Perry, PT 05/04/19 1558      Row Name 05/04/19 1556             Gait/Stairs Assessment/Training    [place] Level (Gait)  contact guard;verbal cues  -CS      Assistive Device (Gait)  walker, front-wheeled  -CS      Distance in Feet (Gait)  150  -CS      Pattern (Gait)  step-through  -CS      Deviations/Abnormal Patterns (Gait)  ronal decreased;stride length decreased  -CS      Bilateral Gait Deviations  forward flexed posture  -CS      Recorded by [CS] Isma Perry, PT 05/04/19 1558      Row Name 05/04/19 1556             Positioning and Restraints    Pre-Treatment Position  in bed  -CS      Post Treatment Position  bed  -CS      In Bed  supine;encouraged to call for assist;call light within reach;with family/caregiver  -CS      Recorded by [CS] Isma Perry, PT 05/04/19 1558      Row Name 05/04/19 1556             Pain Scale: Numbers Pre/Post-Treatment    Pain Scale: Numbers, Pretreatment  0/10 - no pain  -CS      Pain Scale: Numbers, Post-Treatment  0/10 - no pain  -CS      Recorded by [CS] Vicky  Isma CHU, PT 05/04/19 1558      Row Name 05/04/19 1556             Coping    Observed Emotional State  accepting;calm;cooperative  -CS      Verbalized Emotional State  acceptance  -CS      Recorded by [CS] Vicky Isma VLAD, PT 05/04/19 1558      Row Name 05/04/19 1556             Plan of Care Review    Plan of Care Reviewed With  patient  -CS      Recorded by [CS] Isma Perry, PT 05/04/19 1558      Row Name 05/04/19 1556             Outcome Summary/Treatment Plan (PT)    Anticipated Discharge Disposition (PT)  home with assist;home with home health  -CS      Recorded by [CS] Isma Perry, PT 05/04/19 1558        User Key  (r) = Recorded By, (t) = Taken By, (c) = Cosigned By    Initials Name Effective Dates Discipline    VON Perry Isma VLAD, PT 05/14/18 -  PT                   Physical Therapy Education     Title: PT OT SLP Therapies (Done)     Topic: Physical Therapy (Done)     Point: Mobility training (Done)     Learning Progress Summary           Patient Acceptance, E,TB, VU by VON at 5/4/2019  3:58 PM    Acceptance, E,TB, VU,DU by CW at 5/3/2019 10:18 AM    Acceptance, E,TB,D, VU,NR by EE at 5/1/2019  1:57 PM                   Point: Home exercise program (Done)     Learning Progress Summary           Patient Acceptance, E,TB, VU by VON at 5/4/2019  3:58 PM    Acceptance, E,TB, VU,DU by CW at 5/3/2019 10:18 AM    Acceptance, E,TB,D, VU,NR by EE at 5/1/2019  1:57 PM                   Point: Body mechanics (Done)     Learning Progress Summary           Patient Acceptance, E,TB, VU by VON at 5/4/2019  3:58 PM    Acceptance, E,TB, VU,DU by CW at 5/3/2019 10:18 AM    Acceptance, E,TB,D, VU,NR by EE at 5/1/2019  1:57 PM                   Point: Precautions (Done)     Learning Progress Summary           Patient Acceptance, E,TB, VU by CS at 5/4/2019  3:58 PM    Acceptance, E,TB, VU,DU by CW at 5/3/2019 10:18 AM                               User Key     Initials Effective Dates Name Provider Type Discipline    EE  04/03/18 -  Umu Monroy, PT Physical Therapist PT    CW 03/07/18 -  Arsalan Khan PTA Physical Therapy Assistant PT    CS 05/14/18 -  Isma Perry PT Physical Therapist PT                PT Recommendation and Plan  Anticipated Discharge Disposition (PT): home with assist, home with home health  Therapy Frequency (PT Clinical Impression): daily  Outcome Summary/Treatment Plan (PT)  Anticipated Discharge Disposition (PT): home with assist, home with home health  Plan of Care Reviewed With: patient  Outcome Summary: Pt walked to bathroom, urinated and also walked around RN station. Tolerated with mild complaints of weakness and an unproductive cough while walking.   Outcome Measures     Row Name 05/04/19 1500 05/03/19 1000          How much help from another person do you currently need...    Turning from your back to your side while in flat bed without using bedrails?  4  -CS  4  -CW     Moving from lying on back to sitting on the side of a flat bed without bedrails?  4  -CS  4  -CW     Moving to and from a bed to a chair (including a wheelchair)?  3  -CS  3  -CW     Standing up from a chair using your arms (e.g., wheelchair, bedside chair)?  3  -CS  3  -CW     Climbing 3-5 steps with a railing?  2  -CS  2  -CW     To walk in hospital room?  3  -CS  3  -CW     AM-PAC 6 Clicks Score  19  -CS  19  -CW        Functional Assessment    Outcome Measure Options  AM-PAC 6 Clicks Basic Mobility (PT)  -CS  AM-PAC 6 Clicks Basic Mobility (PT)  -CW       User Key  (r) = Recorded By, (t) = Taken By, (c) = Cosigned By    Initials Name Provider Type    CW Arsalan Khan PTA Physical Therapy Assistant    CS Isma Perry, PT Physical Therapist         Time Calculation:   PT Charges     Row Name 05/04/19 1600             Time Calculation    Start Time  1510  -CS      Stop Time  1528  -CS      Time Calculation (min)  18 min  -CS      PT Received On  05/04/19  -      PT - Next Appointment  05/05/19  -         User Key  (r) = Recorded By, (t) = Taken By, (c) = Cosigned By    Initials Name Provider Type    CS Isma Perry, PT Physical Therapist        Therapy Charges for Today     Code Description Service Date Service Provider Modifiers Qty    [id number]  PT THER PROC EA 15 MIN 5/4/2019 Isma Perry, PT GP 1          PT G-Codes  Outcome Measure Options: AM-PAC 6 Clicks Basic Mobility (PT)  AM-PAC 6 Clicks Score: 19    Isma Perry PT  5/4/2019

## 2019-05-04 NOTE — NURSING NOTE
Nursing was unsuccessful in placing cath this dayshift. Called and received orders from Dr. Dubois to hold off on having MD place rasheed since patient is still voiding small amounts. RN to notify urology MD if patient's retaining greater than 500ml-600ml.

## 2019-05-04 NOTE — PLAN OF CARE
Problem: Patient Care Overview  Goal: Plan of Care Review  Outcome: Ongoing (interventions implemented as appropriate)   05/04/19 0324   Coping/Psychosocial   Plan of Care Reviewed With patient   Plan of Care Review   Progress improving   OTHER   Outcome Summary Unable to void/ dribbling at start of shift. I&O cath with return of brown/ red urine. Pain controlled with PO pain medication as needed. Ambulated in [place] with staff assistance. Will continue to monitor.        Problem: Pneumonia (Adult)  Goal: Signs and Symptoms of Listed Potential Problems Will be Absent, Minimized or Managed (Pneumonia)  Outcome: Ongoing (interventions implemented as appropriate)      Problem: Urine Elimination Impaired (Adult)  Goal: Effective or Improved Urinary Elimination  Outcome: Ongoing (interventions implemented as appropriate)      Problem: Fall Risk (Adult)  Goal: Absence of Fall  Outcome: Ongoing (interventions implemented as appropriate)      Problem: Skin Injury Risk (Adult)  Goal: Skin Health and Integrity  Outcome: Ongoing (interventions implemented as appropriate)

## 2019-05-04 NOTE — CONSULTS
Subjective     REASON FOR CONSULTATION: Bladder cancer with right ureteral obstruction  Provide an opinion on any further workup or treatment                             REQUESTING PHYSICIAN: Gregorio Harris MD; Tyrese Moody MD    RECORDS OBTAINED:  Records of the patients history including those obtained from the referring provider were reviewed and summarized in detail.    HISTORY OF PRESENT ILLNESS:  The patient is a 78 y.o. year old male who is here for an opinion about the above issue.  Patient was admitted with right-sided hydronephrosis and hematuria.  Patient was been seen by Dr. Harris and on cystoscopy 5/2/2019 was found to have a fungating tumor in the bladder obstructing the right ureteral orifice.  This area was unable to be approached for ureteral stent placement and he required a nephrostomy tube in interventional radiology 5/3/2019.    His staging scans have shown evidence of bibasilar pneumonia but no obvious distant metastatic disease.  Pathology from his cystoscopy and TURBT shows high-grade invasive urothelial carcinoma.    History of Present Illness     History reviewed. No pertinent past medical history.     Past Surgical History:   Procedure Laterality Date   • TRANSURETHRAL RESECTION OF BLADDER TUMOR N/A 5/2/2019    Procedure: CYSTOSCOPY TRANSURETHRAL RESECTION OF BLADDER TUMOR AND LEFT RETROGRADE PYLOGRAM;  Surgeon: Gregorio Harris MD;  Location: [facility];  Service: Urology        No current facility-administered medications on file prior to encounter.      Current Outpatient Medications on File Prior to Encounter   Medication Sig Dispense Refill   • pravastatin (PRAVACHOL) 40 MG tablet Take 40 mg by mouth Daily.          ALLERGIES:    Allergies   Allergen Reactions   • Amoxicillin Hives        Social History     Socioeconomic History   • Marital status:      Spouse name: Not on file   • Number of children: Not on file   • Years of education: Not on file   • Highest  education level: Not on file   Tobacco Use   • Smoking status: Former Smoker     Packs/day: 0.50     Years: 22.00     Pack years: 11.00     Types: Cigarettes     Last attempt to quit: 1979     Years since quittin.3   • Smokeless tobacco: Never Used   Substance and Sexual Activity   • Alcohol use: No     Frequency: Never   • Drug use: No   • Sexual activity: Defer        History reviewed. No pertinent family history.     Review of Systems   Constitutional: Negative for activity change, chills, fatigue and fever.   HENT: Negative for mouth sores, trouble swallowing and voice change.    Eyes: Negative for pain and visual disturbance.   Respiratory: Negative for cough, shortness of breath and wheezing.    Cardiovascular: Negative for chest pain and palpitations.   Gastrointestinal: Negative for abdominal pain, constipation, diarrhea, nausea and vomiting.   Genitourinary: Negative for difficulty urinating, frequency and urgency.   Musculoskeletal: Negative for arthralgias and joint swelling.   Skin: Negative for rash.   Neurological: Negative for dizziness, seizures, weakness and headaches.   Hematological: Negative for adenopathy. Does not bruise/bleed easily.   Psychiatric/Behavioral: Negative for behavioral problems and confusion. The patient is not nervous/anxious.         Objective     Vitals:    19 1432 19 1900 19 2300 19 0700   BP: 134/76 116/80 131/77 124/73   BP Location: Left arm Left arm Left arm Left arm   Patient Position: Sitting Lying Lying Lying   Pulse: 80 89 80 72   Resp: 16 18 18 20   Temp: 98.3 °F (36.8 °C) 97.7 °F (36.5 °C) 97.9 °F (36.6 °C) 97.9 °F (36.6 °C)   TempSrc: Oral Oral Oral Oral   SpO2: 95%  95% 94%   Weight:       Height:         No flowsheet data found.    Physical Exam   Constitutional: He is oriented to person, place, and time. He appears well-developed and well-nourished. No distress.   HENT:   Head: Normocephalic.   Eyes: Conjunctivae and EOM are  normal. Pupils are equal, round, and reactive to light. No scleral icterus.   Neck: Normal range of motion. Neck supple. No JVD present. No thyromegaly present.   Cardiovascular: Normal rate and regular rhythm. Exam reveals no gallop and no friction rub.   No murmur heard.  Pulmonary/Chest: Effort normal and breath sounds normal. He has no wheezes. He has no rales.   Abdominal: Soft. He exhibits no distension and no mass. There is no tenderness.   Musculoskeletal: Normal range of motion. He exhibits no edema or deformity.   Lymphadenopathy:     He has no cervical adenopathy.   Neurological: He is alert and oriented to person, place, and time. He has normal reflexes. No cranial nerve deficit.   Skin: Skin is warm and dry. No rash noted. No erythema.   Psychiatric: He has a normal mood and affect. His behavior is normal. Judgment normal.         RECENT LABS:  Hematology WBC   Date Value Ref Range Status   05/03/2019 11.94 (H) 3.40 - 10.80 10*3/mm3 Final     RBC   Date Value Ref Range Status   05/03/2019 3.55 (L) 4.14 - 5.80 10*6/mm3 Final     Hemoglobin   Date Value Ref Range Status   05/03/2019 9.8 (L) 13.0 - 17.7 g/dL Final     Hematocrit   Date Value Ref Range Status   05/03/2019 30.0 (L) 37.5 - 51.0 % Final     Platelets   Date Value Ref Range Status   05/03/2019 362 140 - 450 10*3/mm3 Final        Lab Results   Component Value Date    GLUCOSE 104 (H) 05/02/2019    CALCIUM 9.9 05/02/2019     05/02/2019    K 3.7 05/02/2019    CO2 21.6 (L) 05/02/2019     (H) 05/02/2019    BUN 22 05/02/2019    CREATININE 1.21 05/02/2019    EGFRIFNONA 58 (L) 05/02/2019    BCR 18.2 05/02/2019    ANIONGAP 9.4 05/02/2019     Lab Results   Component Value Date    IRON 13 (L) 04/30/2019    IRON 13 (L) 04/30/2019    TIBC 207 (L) 04/30/2019    FERRITIN 649.00 (H) 04/30/2019 5/2/2019  Final Diagnosis   1. Bladder Mass, TURBT:               A. Invasive high grade urothelial carcinoma.               B. Muscularis propria  focally present and involved by invasive carcinoma.               C. No definitive lymphovascular space invasion identified by routine staining.               D. Focal perineural invasion is identified.     CT AP 4/30/2019  IMPRESSION:  1.  Findings of extensive duodenitis involving the first and second  portions of the duodenum. No free intraperitoneal air is visualized.  2.  Large soft tissue density mass centered within the right aspect of  the bladder with upstream severe right hydroureteronephrosis. Findings  are most concerning for bladder malignancy and further evaluation with  cystoscopy is recommended. There is an enlarged right pelvic sidewall  lymph node highly concerning for metastatic disease.  3.  Findings of bibasilar pneumonia, most suggestive of aspiration as  etiology.    CT CHEST 5/1/2019  IMPRESSION:  1. There is bibasilar pneumonia with bronchial wall thickening, mucous  plugging and fine high density punctate foci. The findings are most  suggestive for aspiration. Additional areas of patchy ground glass  infiltrate are also seen within bilateral upper lobes and the right  middle lobe. There is suggestion of underlying bronchiectasis within the  bilateral lower lobes.  2. Small sliding hiatal hernia.  3. Multiple mediastinal lymph nodes favored to be reactive. Follow-up CT  chest is recommended in 3 months to reevaluate.    Assessment/Plan   1.  Locally advanced high-grade urothelial carcinoma of the bladder with large obstructing mass securing the right ureteral orifice.  2.  Percutaneous nephrostomy tube on the right 5/3/2019  3.  Anemia with iron studies consistent with anemia of chronic disease  4.  Possible mild dementia  5.  Presumed aspiration pneumonia    Recommendations  1.  We tried to have a discussion with the patient regarding the stage prognosis and treatment options for this locally advanced bladder cancer.  He seemed to have some difficulty grasping the situation.  I explained  that with no obvious distant metastatic disease he would have the option of pursuing radical cystectomy and ileal conduit surgery.  If he recovered well from the surgery we could consider postop chemotherapy.  The other option would be initiating chemotherapy and radiation therapy to try to shrink the tumor and then either consider observation with further palliative treatment as needed with chemotherapy and/or immunotherapy or the possibility of surgical resection at that point.  2.  There was no family in the room at time of our discussion.  We will try to return tomorrow and have further discussion with the patient and his family.  3.  If the patient is otherwise anxious to be discharged from the hospital with home health to assist with his nephrostomy tube we could plan to schedule him back in our office and also schedule an appointment with radiation oncology.  4.  If the patient desires to proceed with radical cystectomy it probably would be in his best interest to proceed with surgery as soon as possible on this admission.  5.  We will request PDL 1 staining on the patient's tumor.    Thanks for asking see this nice gentleman in consultation

## 2019-05-04 NOTE — PLAN OF CARE
Problem: Patient Care Overview  Goal: Plan of Care Review  Outcome: Ongoing (interventions implemented as appropriate)   05/04/19 8888   Coping/Psychosocial   Plan of Care Reviewed With patient   OTHER   Outcome Summary Pt walked to bathroom, urinated and also walked around RN station. Tolerated with mild complaints of weakness and an unproductive cough while walking.

## 2019-05-04 NOTE — PLAN OF CARE
Problem: Patient Care Overview  Goal: Plan of Care Review  Outcome: Ongoing (interventions implemented as appropriate)   05/04/19 3450   Coping/Psychosocial   Plan of Care Reviewed With patient;spouse   Plan of Care Review   Progress declining   OTHER   Outcome Summary Patient alert and oriented for majority of this day shift - at times pt was disoriented, but has been sleeping majority of this shift. RN treated patient's pain this morning with PO percocet per pt request. Pt ambulated in halls with physical therapy and has done well ambulating in room and up to toilet. Pt is retaining urine. RN received verbal orders from APRN this morning regarding retention - please see nursing note. This afternoon the pt began retaining greater than 250ml of urine. RN assigned to patient attempted placement of 14F regular rasheed catheter per order and also attempted placement of coudé catheter due to unsuccessful placement of regular rasheed catheter. ALICIA Pabon also attempted placement of coudé catheter on pt and was unsuccessful. RN has call out to on-call urologist to inform and see if they can come in to place catheter. After multiple attempts at insertion - blood was present at tip of penis. RN could palpate catheters coiling when palpating outside of bladder/perineal area. Pt has large mass on bladder that has metastasized - please see imaging dictations. Pt is currently resting with wife at bedside. Will continue to monitor.        Problem: Pneumonia (Adult)  Goal: Signs and Symptoms of Listed Potential Problems Will be Absent, Minimized or Managed (Pneumonia)  Outcome: Ongoing (interventions implemented as appropriate)      Problem: Urine Elimination Impaired (Adult)  Goal: Effective or Improved Urinary Elimination  Outcome: Ongoing (interventions implemented as appropriate)    Goal: Reduced Incontinence Episodes  Outcome: Ongoing (interventions implemented as appropriate)      Problem: Fall Risk (Adult)  Goal: Absence of  Fall  Outcome: Ongoing (interventions implemented as appropriate)      Problem: Skin Injury Risk (Adult)  Goal: Skin Health and Integrity  Outcome: Ongoing (interventions implemented as appropriate)      Problem: Nutrition, Imbalanced: Inadequate Oral Intake (Adult)  Goal: Prevent Further Weight Loss  Outcome: Ongoing (interventions implemented as appropriate)

## 2019-05-04 NOTE — PLAN OF CARE
Problem: Patient Care Overview  Goal: Plan of Care Review  Outcome: Ongoing (interventions implemented as appropriate)   05/03/19 9833   Coping/Psychosocial   Plan of Care Reviewed With patient;spouse   Plan of Care Review   Progress no change   OTHER   Outcome Summary Patient's continuous bladder irrigation discontinued and rasheed removed. Post rasheed removal, patient up frequently to urinate with assistance. Nephrostomy tube placed today. Norco given once for pain control. CBC consulted and ordered labs to follow. Will continue to monitor.      Goal: Individualization and Mutuality  Outcome: Ongoing (interventions implemented as appropriate)    Goal: Discharge Needs Assessment  Outcome: Ongoing (interventions implemented as appropriate)    Goal: Interprofessional Rounds/Family Conf  Outcome: Ongoing (interventions implemented as appropriate)      Problem: Pneumonia (Adult)  Goal: Signs and Symptoms of Listed Potential Problems Will be Absent, Minimized or Managed (Pneumonia)  Outcome: Ongoing (interventions implemented as appropriate)      Problem: Urine Elimination Impaired (Adult)  Goal: Identify Related Risk Factors and Signs and Symptoms  Outcome: Outcome(s) achieved Date Met: 05/03/19    Goal: Effective or Improved Urinary Elimination  Outcome: Ongoing (interventions implemented as appropriate)    Goal: Effective Containment of Urine  Outcome: Ongoing (interventions implemented as appropriate)    Goal: Reduced Incontinence Episodes  Outcome: Ongoing (interventions implemented as appropriate)      Problem: Fall Risk (Adult)  Goal: Identify Related Risk Factors and Signs and Symptoms  Outcome: Outcome(s) achieved Date Met: 05/03/19    Goal: Absence of Fall  Outcome: Ongoing (interventions implemented as appropriate)      Problem: Skin Injury Risk (Adult)  Goal: Identify Related Risk Factors and Signs and Symptoms  Outcome: Outcome(s) achieved Date Met: 05/03/19    Goal: Skin Health and Integrity  Outcome: Ongoing  (interventions implemented as appropriate)      Problem: Nutrition, Imbalanced: Inadequate Oral Intake (Adult)  Goal: Identify Related Risk Factors and Signs and Symptoms  Outcome: Outcome(s) achieved Date Met: 05/03/19    Goal: Improved Oral Intake  Outcome: Ongoing (interventions implemented as appropriate)    Goal: Prevent Further Weight Loss  Outcome: Ongoing (interventions implemented as appropriate)

## 2019-05-05 NOTE — PROGRESS NOTES
"    DAILY PROGRESS NOTE  [facility]    Patient Identification:  Name: Modesto Diego  Age: 78 y.o.  Sex: male  :  1940  MRN: [id number]         Primary Care Physician: Mg Root MD    Subjective:  Interval History: Claims no longer confused.  States pain is well controlled.  Denies chest pain shortness of breath.  He very clearly wants to proceed and do anything and everything he can in regards to the above new findings concerning for bladder cancer    Objective: No family at bedside    Scheduled Meds:  cefOXitin 2 g Intravenous Q8H   famotidine 20 mg Oral BID   insulin lispro 0-9 Units Subcutaneous 4x Daily With Meals & Nightly   pravastatin 40 mg Oral Daily   sodium chloride 3 mL Intravenous Q12H     Continuous Infusions:     Vital signs in last 24 hours:  Temp:  [97.8 °F (36.6 °C)-99.3 °F (37.4 °C)] 98.3 °F (36.8 °C)  Heart Rate:  [] 67  Resp:  [16-18] 16  BP: (106-140)/(54-76) 109/67    Intake/Output:    Intake/Output Summary (Last 24 hours) at 2019 1046  Last data filed at 2019 0837  Gross per 24 hour   Intake 480 ml   Output 800 ml   Net -320 ml       Exam:  /67 (BP Location: Left arm, Patient Position: Lying)   Pulse 67   Temp 98.3 °F (36.8 °C) (Oral)   Resp 16   Ht 177.8 cm (70\")   Wt 72.2 kg (159 lb 3.2 oz)   SpO2 95%   BMI 22.84 kg/m²     General Appearance:    Alert, cooperative, no distress, AAOx3                        Throat:   Lips, tongue, gums normal; oral mucosa pink and moist                           Neck:   Supple, symmetrical, trachea midline, no JVD                         Lungs:    Clear to auscultation bilaterally, respirations unlabored                          Heart:    Regular rate and rhythm, S1 and S2 normal                  Abdomen:     Soft, non-tender, bowel sounds active, nephrostomy tube draining concentrated/mildly bloody urine                 extremities: Grossly moving all, no cyanosis or edema                         "   Data Review:  Labs in chart were reviewed.    Assessment:  Active Hospital Problems    Diagnosis  POA   • **Aspiration pneumonia due to vomitus (CMS/HCC) [J69.0]  Unknown   • Mediastinal lymphadenopathy [R59.0]  Unknown   • Iron deficiency anemia due to chronic blood loss [D50.0]  Unknown   • Weight loss [R63.4]  Unknown   • Bladder mass [N32.89]  Unknown   • Bladder mass [N32.89]  Unknown   • Duodenitis [K29.80]  Unknown   • Anemia [D64.9]  Unknown      Resolved Hospital Problems   No resolved problems to display.       Plan:  Patient is alert and oriented x3 today.  He very clearly wants to proceed with extensive surgical intervention and do everything he can in regards to the above new finding concerning for bladder cancer    D5 Cefoxitin for aspiration pneumonia    We will await Dr. Harris with urology to return tomorrow    Oncology following along and consult    Hemoglobin is trended up to 9.3 status post transfusion    Tyrese Moody MD  5/5/2019  10:46 AM

## 2019-05-05 NOTE — PROGRESS NOTES
Subjective   REASON FOR CONSULTATION: Bladder cancer with right ureteral obstruction    HISTORY OF PRESENT ILLNESS:   The patient is an  78 y.o. male who is here for an opinion about the above issue.  Patient was admitted with right-sided hydronephrosis and hematuria.  Patient was been seen by Dr. Harris and on cystoscopy 5/2/2019 was found to have a fungating tumor in the bladder obstructing the right ureteral orifice.  This area was unable to be approached for ureteral stent placement and he required a nephrostomy tube in interventional radiology 5/3/2019.     His staging scans have shown evidence of bibasilar pneumonia but no obvious distant metastatic disease.  Pathology from his cystoscopy and TURBT shows high-grade invasive urothelial carcinoma.    Required placement of Bey cath 5/4/2019. Patient comfortable and seems to be leaning toward surgery.    History of Present Illness     Past Medical History, Past Surgical History, Social History, Family History have been reviewed and are without significant changes except as mentioned.    Review of Systems   Constitutional: Negative for activity change, chills, fatigue and fever.   HENT: Negative for mouth sores, trouble swallowing and voice change.    Eyes: Negative for pain and visual disturbance.   Respiratory: Negative for cough, shortness of breath and wheezing.    Cardiovascular: Negative for chest pain and palpitations.   Gastrointestinal: Negative for abdominal pain, constipation, diarrhea, nausea and vomiting.   Genitourinary: See HPI  Musculoskeletal: Negative for arthralgias and joint swelling.   Skin: Negative for rash.   Neurological: Balance issues.  He uses a walker for ambulation  Hematological: Negative for adenopathy. Does not bruise/bleed easily.   Psychiatric/Behavioral: Mildly confused      A comprehensive 14 point review of systems was performed and was negative except as mentioned.    Medications:  The current medication list was reviewed  in the EMR    ALLERGIES:    Allergies   Allergen Reactions   • Amoxicillin Hives       Objective      Vitals:    05/04/19 2326 05/05/19 0001 05/05/19 0539 05/05/19 0700   BP: 130/74   109/67   BP Location: Left arm   Left arm   Patient Position: Lying   Lying   Pulse:  82  67   Resp: 18   16   Temp: 97.9 °F (36.6 °C)   98.3 °F (36.8 °C)   TempSrc: Oral   Oral   SpO2:  94%  95%   Weight:   72.2 kg (159 lb 3.2 oz)    Height:         No flowsheet data found.    Physical Exam    Constitutional: He is oriented to person, place, and time. He appears well-developed and well-nourished. No distress.   HENT:   Head: Normocephalic.   Eyes: Conjunctivae and EOM are normal. Pupils are equal, round, and reactive to light. No scleral icterus.   Neck: Normal range of motion. Neck supple. No JVD present. No thyromegaly present.   Cardiovascular: Normal rate and regular rhythm. Exam reveals no gallop and no friction rub.   No murmur heard.  Pulmonary/Chest: Effort normal and breath sounds normal. He has no wheezes. He has no rales.   Abdominal: Soft.  Right nephrostomy tube.  Bey catheter to bedside drain.  Musculoskeletal: Normal range of motion. He exhibits no edema or deformity.   Lymphadenopathy:     He has no cervical adenopathy.   Neurological: He is alert and oriented to person, place, and time. He has normal reflexes. No cranial nerve deficit.   Skin: Skin is warm and dry. No rash noted. No erythema.         RECENT LABS:  Hematology WBC   Date Value Ref Range Status   05/05/2019 12.52 (H) 3.40 - 10.80 10*3/mm3 Final     RBC   Date Value Ref Range Status   05/05/2019 3.37 (L) 4.14 - 5.80 10*6/mm3 Final     Hemoglobin   Date Value Ref Range Status   05/05/2019 9.3 (L) 13.0 - 17.7 g/dL Final     Hematocrit   Date Value Ref Range Status   05/05/2019 29.0 (L) 37.5 - 51.0 % Final     Platelets   Date Value Ref Range Status   05/05/2019 351 140 - 450 10*3/mm3 Final            Lab Results   Component Value Date    GLUCOSE 98  05/05/2019    BUN 20 05/05/2019    CREATININE 1.39 (H) 05/05/2019    EGFRIFNONA 49 (L) 05/05/2019    BCR 14.4 05/05/2019    K 3.6 05/05/2019    CO2 21.2 (L) 05/05/2019    CALCIUM 10.3 05/05/2019    ALBUMIN 3.00 (L) 05/05/2019    AST 13 05/05/2019    ALT 12 05/05/2019     Lab Results   Component Value Date    IRON 13 (L) 04/30/2019    IRON 13 (L) 04/30/2019    TIBC 207 (L) 04/30/2019    FERRITIN 649.00 (H) 04/30/2019 5/2/2019  Final Diagnosis   1. Bladder Mass, TURBT:               A. Invasive high grade urothelial carcinoma.               B. Muscularis propria focally present and involved by invasive carcinoma.               C. No definitive lymphovascular space invasion identified by routine staining.               D. Focal perineural invasion is identified.      CT AP 4/30/2019  IMPRESSION:  1.  Findings of extensive duodenitis involving the first and second  portions of the duodenum. No free intraperitoneal air is visualized.  2.  Large soft tissue density mass centered within the right aspect of  the bladder with upstream severe right hydroureteronephrosis. Findings  are most concerning for bladder malignancy and further evaluation with  cystoscopy is recommended. There is an enlarged right pelvic sidewall  lymph node highly concerning for metastatic disease.  3.  Findings of bibasilar pneumonia, most suggestive of aspiration as  etiology.     CT CHEST 5/1/2019  IMPRESSION:  1. There is bibasilar pneumonia with bronchial wall thickening, mucous  plugging and fine high density punctate foci. The findings are most  suggestive for aspiration. Additional areas of patchy ground glass  infiltrate are also seen within bilateral upper lobes and the right  middle lobe. There is suggestion of underlying bronchiectasis within the  bilateral lower lobes.  2. Small sliding hiatal hernia.  3. Multiple mediastinal lymph nodes favored to be reactive. Follow-up CT  chest is recommended in 3 months to reevaluate.     CT HEAD  5/1/2019  IMPRESSION:  1. There is mild small vessel disease in the cerebral white matter and  mild cerebral atrophy. There are calcified atherosclerotic plaques in  the intracranial segments of the distal vertebral arteries and cavernous  segments of the internal carotid arteries bilaterally.  2. Partial opacification of the ethmoid and maxillary sinuses  bilaterally and the left sphenoid sinus with fluid and mucosal  thickening.  3. The remainder of the head CT is normal.      Assessment/Plan   1.  Locally advanced high-grade urothelial carcinoma of the bladder with large obstructing mass securing the right ureteral orifice.  2.  Percutaneous nephrostomy tube on the right 5/3/2019  3.  Anemia with iron studies consistent with anemia of chronic disease  4.  Possible mild dementia  5.  Presumed aspiration pneumonia     Recommendations  1.  We again tried to have a discussion with the patient regarding the stage prognosis and treatment options for this locally advanced bladder cancer.  He seemed to have some difficulty grasping the situation.    I explained that with no obvious distant metastatic disease he would have the option of pursuing radical cystectomy and ileal conduit surgery.  If he recovered well from the surgery we could consider postop adjuvant chemotherapy.    The other option would be initiating chemotherapy and radiation therapy to try to shrink the tumor and then either consider observation with further palliative treatment as needed with chemotherapy and/or immunotherapy, or the possibility of surgical resection at that point.    2.  There was no family in the room at time of our discussion.  We will try to return tomorrow and have further discussion with the patient and his family.    3.  If the patient is otherwise anxious to be discharged from the hospital with home health to assist with his nephrostomy tube we could plan to schedule him back in our office and also schedule an appointment with  radiation oncology.    4.  If the patient desires to proceed with radical cystectomy it probably would be in his best interest to proceed with surgery as soon as possible on this admission.    5.  We have requested PDL 1 staining on the patient's tumor.                               5/5/2019      CC:

## 2019-05-05 NOTE — PLAN OF CARE
Problem: Patient Care Overview  Goal: Plan of Care Review  Outcome: Ongoing (interventions implemented as appropriate)   05/05/19 0520   Coping/Psychosocial   Plan of Care Reviewed With patient   Plan of Care Review   Progress declining   OTHER   Outcome Summary Pt alert, disoriented to situation at times. Bey catheter placed for urinary retention with little urine returned overnight. Medicated as needed for pain with PO percocet. Unsteady gait with generalized weakness during ambulation in room. Wife at bedside questioning plan of care, long discussion about quality of life. Wife may be interested in talking with palliative care RN. Will continue to monitor.        Problem: Fall Risk (Adult)  Goal: Absence of Fall  Outcome: Ongoing (interventions implemented as appropriate)      Problem: Skin Injury Risk (Adult)  Goal: Skin Health and Integrity  Outcome: Ongoing (interventions implemented as appropriate)

## 2019-05-05 NOTE — PLAN OF CARE
Problem: Patient Care Overview  Goal: Plan of Care Review  Outcome: Ongoing (interventions implemented as appropriate)   05/05/19 7544   Coping/Psychosocial   Plan of Care Reviewed With patient   Plan of Care Review   Progress no change   OTHER   Outcome Summary pt alert, disoriented to situation, FC still in place, no c/o pain or nausea at this point, according to Dr. Moody pt wants to proceed with surgery if needed. will cont to monitor     Goal: Individualization and Mutuality  Outcome: Ongoing (interventions implemented as appropriate)    Goal: Discharge Needs Assessment  Outcome: Ongoing (interventions implemented as appropriate)    Goal: Interprofessional Rounds/Family Conf  Outcome: Ongoing (interventions implemented as appropriate)      Problem: Pneumonia (Adult)  Goal: Signs and Symptoms of Listed Potential Problems Will be Absent, Minimized or Managed (Pneumonia)  Outcome: Ongoing (interventions implemented as appropriate)      Problem: Urine Elimination Impaired (Adult)  Goal: Effective or Improved Urinary Elimination  Outcome: Ongoing (interventions implemented as appropriate)    Goal: Effective Containment of Urine  Outcome: Ongoing (interventions implemented as appropriate)    Goal: Reduced Incontinence Episodes  Outcome: Ongoing (interventions implemented as appropriate)      Problem: Fall Risk (Adult)  Goal: Absence of Fall  Outcome: Ongoing (interventions implemented as appropriate)      Problem: Skin Injury Risk (Adult)  Goal: Skin Health and Integrity  Outcome: Ongoing (interventions implemented as appropriate)      Problem: Nutrition, Imbalanced: Inadequate Oral Intake (Adult)  Goal: Improved Oral Intake  Outcome: Ongoing (interventions implemented as appropriate)    Goal: Prevent Further Weight Loss  Outcome: Ongoing (interventions implemented as appropriate)

## 2019-05-05 NOTE — PLAN OF CARE
Problem: Patient Care Overview  Goal: Plan of Care Review  Outcome: Ongoing (interventions implemented as appropriate)   05/05/19 1421   Coping/Psychosocial   Plan of Care Reviewed With patient   Plan of Care Review   Progress no change   OTHER   Outcome Summary Pt presented to PT today with increased fatigue. He was not able to ambulate as far today, needing to turn around prior to completing a full lap. Pt demonstrated significantly slowed gait speed and step length. He required a rwx and min Ax1 to ambulate. Pt will benefit from continued skilled PT to address remaining functional mobility deficits and decrease his risk for falls.

## 2019-05-05 NOTE — PROGRESS NOTES
"\"I am tired\"    POD 3 cystoscopy and TURBT 5/2/19  POD 2 Right neph tube placement    Neph tube intact, draining yellow 50cc out overnight  Bey anchored for UR, 503 cc drained  Cr 1.39  Hgb 9.3    NAD  Pain is controlled  Soft abdomen    Plan:    Will see in am  If discharged home will need outpatient appointment with Dr. Karel Harris, [facility], [phone] this week to further discuss bladder cancer treatment plan.        "

## 2019-05-05 NOTE — NURSING NOTE
Call placed to Dr Dubois regarding urinary retention and bladder scan volume of 503 this evening. Orders to place 18F coude catheter received. Catheter placed without difficulty and anchored to bedside drainage bag. Urology to see in AM.

## 2019-05-05 NOTE — THERAPY TREATMENT NOTE
Acute Care - Physical Therapy Treatment Note  [facility]     Patient Name: Modesto Diego  : 1940  MRN: [id number]  Today's Date: 2019  Onset of Illness/Injury or Date of Surgery: 19     Referring Physician: Fransisco    Admit Date: 2019    Visit Dx:    ICD-10-CM ICD-9-CM   1. Bladder mass N32.89 596.89   2. Unsteadiness on feet R26.81 781.2     Patient Active Problem List   Diagnosis   • Weight loss   • Aspiration pneumonia due to vomitus (CMS/HCC)   • Bladder mass   • Bladder mass   • Duodenitis   • Anemia   • Mediastinal lymphadenopathy   • Iron deficiency anemia due to chronic blood loss       Therapy Treatment    Rehabilitation Treatment Summary     Row Name 19 141             Treatment Time/Intention    Discipline  physical therapist  -CH      Document Type  therapy note (daily note)  -CH      Subjective Information  no complaints  -CH      Mode of Treatment  physical therapy;individual therapy  -CH      Patient/Family Observations  male pt, supine in bed, no acute distress noted  -CH      Care Plan Review  evaluation/treatment results reviewed  -CH      Total Minutes, Physical Therapy Treatment  19  -CH      Therapy Frequency (PT Clinical Impression)  daily  -CH      Patient Effort  good  -CH      Existing Precautions/Restrictions  fall  -CH      Recorded by [CH] Mat Reid, PT 19 1421      Row Name 19 1417             Vital Signs    Pre SpO2 (%)  96  -CH      Intra SpO2 (%)  90  -CH      Post SpO2 (%)  93  -CH      Recorded by [CH] Mat Reid, PT 19 1421      Row Name 19 1417             Cognitive Assessment/Intervention- PT/OT    Orientation Status (Cognition)  oriented x 4  -CH      Follows Commands (Cognition)  WNL  -CH      Personal Safety Interventions  fall prevention program maintained;gait belt;nonskid shoes/slippers when out of bed  -CH      Recorded by [CH] Mat Reid, PT 19 1421      Row Name 19 141             Bed  Mobility Assessment/Treatment    Supine-Sit [place] (Bed Mobility)  minimum assist (75% patient effort)  -      Sit-Supine [place] (Bed Mobility)  moderate assist (50% patient effort)  -      Bed Mobility, Safety Issues  impaired trunk control for bed mobility  -      Assistive Device (Bed Mobility)  bed rails  -CH      Recorded by [] Mat Reid, PT 05/05/19 1421      Row Name 05/05/19 1417             Sit-Stand Transfer    Sit-Stand [place] (Transfers)  minimum assist (75% patient effort)  -      Assistive Device (Sit-Stand Transfers)  walker, front-wheeled  -CH      Recorded by [] Mat Reid, PT 05/05/19 1421      Row Name 05/05/19 1417             Stand-Sit Transfer    Stand-Sit [place] (Transfers)  contact guard;verbal cues  -      Assistive Device (Stand-Sit Transfers)  walker, front-wheeled  -CH      Recorded by [] Mat Reid, PT 05/05/19 1421      Row Name 05/05/19 1417             Gait/Stairs Assessment/Training    [place] Level (Gait)  minimum assist (75% patient effort)  -      Assistive Device (Gait)  walker, front-wheeled  -      Distance in Feet (Gait)  100  -CH      Pattern (Gait)  step-to  -      Deviations/Abnormal Patterns (Gait)  ronal decreased;festinating/shuffling;gait speed decreased;stride length decreased  -      Bilateral Gait Deviations  forward flexed posture  -CH      Recorded by [] Mat Reid, PT 05/05/19 1421      Row Name 05/05/19 1417             Therapeutic Exercise    Lower Extremity (Therapeutic Exercise)  LAQ (long arc quad), bilateral;marching while seated  -      Exercise Type (Therapeutic Exercise)  AROM (active range of motion)  -      Position (Therapeutic Exercise)  seated  -      Sets/Reps (Therapeutic Exercise)  1/10  -CH      Recorded by [] Mat Reid, PT 05/05/19 1421      Row Name 05/05/19 1417             Positioning and Restraints    Pre-Treatment Position  in bed  -      Post  Treatment Position  bed  -CH      In Bed  supine;call light within reach;encouraged to call for assist;exit alarm on;with family/caregiver  -      Recorded by [CH] Mat Reid, PT 05/05/19 1421      Row Name 05/05/19 1417             Pain Scale: Numbers Pre/Post-Treatment    Pain Scale: Numbers, Pretreatment  0/10 - no pain  -CH      Pain Scale: Numbers, Post-Treatment  0/10 - no pain  -      Recorded by [] Mat Reid, PT 05/05/19 1421        User Key  (r) = Recorded By, (t) = Taken By, (c) = Cosigned By    Initials Name Effective Dates Discipline     Mat Reid, PT 04/03/18 -  PT                   Physical Therapy Education     Title: PT OT SLP Therapies (Done)     Topic: Physical Therapy (Done)     Point: Mobility training (Done)     Learning Progress Summary           Patient Acceptance, E, VU by  at 5/5/2019  2:21 PM    Acceptance, E,TB, VU by CS at 5/4/2019  3:58 PM    Acceptance, E,TB, VU,DU by CW at 5/3/2019 10:18 AM    Acceptance, E,TB,D, VU,NR by  at 5/1/2019  1:57 PM                   Point: Home exercise program (Done)     Learning Progress Summary           Patient Acceptance, E, VU by  at 5/5/2019  2:21 PM    Acceptance, E,TB, VU by CS at 5/4/2019  3:58 PM    Acceptance, E,TB, VU,DU by CW at 5/3/2019 10:18 AM    Acceptance, E,TB,D, VU,NR by EE at 5/1/2019  1:57 PM                   Point: Body mechanics (Done)     Learning Progress Summary           Patient Acceptance, E, VU by  at 5/5/2019  2:21 PM    Acceptance, E,TB, VU by CS at 5/4/2019  3:58 PM    Acceptance, E,TB, VU,DU by CW at 5/3/2019 10:18 AM    Acceptance, E,TB,D, VU,NR by  at 5/1/2019  1:57 PM                   Point: Precautions (Done)     Learning Progress Summary           Patient Acceptance, E, VU by  at 5/5/2019  2:21 PM    Acceptance, E,TB, VU by CS at 5/4/2019  3:58 PM    Acceptance, E,TB, VU,TATIANA by CHARI at 5/3/2019 10:18 AM                               User Key     Initials Effective Dates Name  Provider Type Discipline    EE 04/03/18 -  Umu Monroy, PT Physical Therapist PT     03/07/18 -  Arsalan Khan PTA Physical Therapy Assistant PT     04/03/18 -  Mat Reid, KARLA Physical Therapist PT     05/14/18 -  Isma Perry PT Physical Therapist PT                PT Recommendation and Plan  Therapy Frequency (PT Clinical Impression): daily  Plan of Care Reviewed With: patient  Progress: no change  Outcome Summary: Pt presented to PT today with increased fatigue. He was not able to ambulate as far today, needing to turn around prior to completing a full lap. Pt demonstrated significantly slowed gait speed and step length. He required a rwx and min Ax1 to ambulate. Pt will benefit from continued skilled PT to address remaining functional mobility deficits and decrease his risk for falls.  Outcome Measures     Row Name 05/05/19 1400 05/04/19 1500 05/03/19 1000       How much help from another person do you currently need...    Turning from your back to your side while in flat bed without using bedrails?  4  -  4  -CS  4  -CW    Moving from lying on back to sitting on the side of a flat bed without bedrails?  3  -  4  -CS  4  -CW    Moving to and from a bed to a chair (including a wheelchair)?  3  -  3  -CS  3  -CW    Standing up from a chair using your arms (e.g., wheelchair, bedside chair)?  3  -  3  -CS  3  -CW    Climbing 3-5 steps with a railing?  2  -CH  2  -CS  2  -CW    To walk in hospital room?  3  -  3  -CS  3  -CW    AM-PAC 6 Clicks Score  18  -CH  19  -CS  19  -CW       Functional Assessment    Outcome Measure Options  AM-PAC 6 Clicks Basic Mobility (PT)  -CH  AM-PAC 6 Clicks Basic Mobility (PT)  -CS  AM-PAC 6 Clicks Basic Mobility (PT)  -CW      User Key  (r) = Recorded By, (t) = Taken By, (c) = Cosigned By    Initials Name Provider Type    CW Arsalan Khan PTA Physical Therapy Assistant     Mat Reid, PT Physical Therapist    CS Isma Perry, KARLA  Physical Therapist         Time Calculation:   PT Charges     Row Name 05/05/19 1424             Time Calculation    Start Time  1358  -      Stop Time  1417  -      Time Calculation (min)  19 min  -      PT Received On  05/05/19  -      PT - Next Appointment  05/06/19  -        User Key  (r) = Recorded By, (t) = Taken By, (c) = Cosigned By    Initials Name Provider Type    CH Mat Reid, PT Physical Therapist        Therapy Charges for Today     Code Description Service Date Service Provider Modifiers Qty    [id number]  PT THER PROC EA 15 MIN 5/5/2019 Mat Reid, PT GP 1          PT G-Codes  Outcome Measure Options: AM-PAC 6 Clicks Basic Mobility (PT)  AM-PAC 6 Clicks Score: 18    Mat Reid PT  5/5/2019

## 2019-05-06 NOTE — NURSING NOTE
Pt with decreased urine output and c/o bladder fullness. F/C tubing adjusted and large clot observed passing through F/C tubing with increased urine output. Will continue to monitor.

## 2019-05-06 NOTE — PLAN OF CARE
Problem: Patient Care Overview  Goal: Plan of Care Review   05/06/19 9544   Coping/Psychosocial   Plan of Care Reviewed With patient;family   OTHER   Outcome Summary Pt. requires Min. assist for bed mobility, transfers, and gait this day. Pt. able to ambulate 100 feet with use of Rwx but limited overall by fatigue, weakness. Verbal/tactile cues given throughout ambulation for posture correction and for Rwx guidance.

## 2019-05-06 NOTE — PROGRESS NOTES
REASON FOR FOLLOWUP/CHIEF COMPLAINT:  Advanced bladder cancer    HISTORY OF PRESENT ILLNESS:   No new events overnight.  Remains very weak.  Wife states he lost 40 pounds since early January 2019.  She reports he has become quickly and progressively weak.  She reports it is a struggle to transport from home.  She reports she cannot imagine him coming in daily for radiation treatments or even weekly for chemo treatments.  However, she states she very much supports his desire for cystectomy.    Past Medical History, Past Surgical History, Social History, Family History have been reviewed and are without significant changes except as mentioned.    Review of Systems   Review of Systems   Constitutional: Negative for activity change.   HENT: Negative for nosebleeds and trouble swallowing.    Respiratory: Negative for shortness of breath and wheezing.    Cardiovascular: Negative for chest pain and palpitations.   Gastrointestinal: Negative for constipation, diarrhea and nausea.   Genitourinary: Negative for dysuria and hematuria.   Musculoskeletal: Negative for arthralgias and myalgias.   Neurological: Negative for seizures and syncope.   Hematological: Negative for adenopathy. Does not bruise/bleed easily.   Psychiatric/Behavioral: Negative for confusion.       Medications:  The current medication list was reviewed in the EMR    ALLERGIES:    Allergies   Allergen Reactions   • Amoxicillin Hives              Vitals:    05/05/19 0700 05/05/19 1300 05/05/19 1932 05/05/19 2329   BP: 109/67 117/67 151/81 138/67   BP Location: Left arm Right arm Left arm Left arm   Patient Position: Lying Lying Lying Lying   Pulse: 67  92 78   Resp: 16 18 18 18   Temp: 98.3 °F (36.8 °C) 97.8 °F (36.6 °C) 98.2 °F (36.8 °C) 98 °F (36.7 °C)   TempSrc: Oral Oral Oral Oral   SpO2: 95% 96% 94% 94%   Weight:       Height:         Physical Exam    CONSTITUTIONAL:  Vital signs reviewed.  No distress, looks comfortable,, but frail and quite  weak.  EYES:  Conjunctiva and lids unremarkable.  PERRLA  EARS,NOSE,MOUTH,THROAT:  Ears and nose appear unremarkable.  Lips, teeth, gums appear unremarkable.  RESPIRATORY:  Normal respiratory effort.  Lungs clear to auscultation bilaterally.  CARDIOVASCULAR:  Normal S1, S2.  No murmurs rubs or gallops.  No significant lower extremity edema.  GASTROINTESTINAL: Abdomen appears unremarkable.  Nontender.  No hepatomegaly.  No splenomegaly.  NEURO: cranial nerves 2-12 grossly intact.  No focal deficits.  Appears to have equal strength all 4 extremities.  MUSCULOSKELETAL:  Unremarkable digits/nails.  No cyanosis or clubbing.  SKIN:  Warm.  No rashes.  PSYCHIATRIC:    Normal mood and affect.  Not clearly confused but slow to respond.     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  · T2 N1 M0, stage IIIa  PDL 1 pending.  CT AP 4/30/2019: Large bladder mass with severe right hydroureteronephrosis and right pelvic sidewall node 1.5 cm.  CT chest 5/1/2019: Multiple mediastinal nodes up to 1.3 cm, radiologist favored reactive.  Therefore, no clear evidence of distant disease.  However, with the locally advanced bladder tumor, this is felt to be unlikely curable.  Dr. Karel Harris offered a palliative cystectomy.  However, reviewing the patient's age of 78 years old and performance status (requires a walker) he felt the best approach would be comfort care with the help of hospice.  · If he wants an aggressive  approach, NCCN recommendations for systemic therapy (without XRT) include carboplatin Gemzar (not a good cisplatin candidate), or pembrolizumab, or atezolizumab.  · It is noted if ECOG 2 or higher (as in this case), chemotherapy has less benefit.  · If we give concurrent systemic therapy with XRT, a cisplatin doublet is preferred.  With his renal dysfunction and advanced age, he is not a good cisplatin candidate.    · We could consider 5-FU and mitomycin or single agent Gemzar concurrent with XRT.    · Manufactures guidelines recommend not using mitomycin if creatinine >1.7.  However, with a creatinine of 1.4 in an 78-year-old, I would suspect some difficulty.  · NCCN recommendations recommend not using carboplatin concurrent with XRT.  For optimal outcomes, even if he received concurrent chemoradiation with an optimal regimen such as a cisplatin doublet, with his presentation of hydronephrosis, this would be followed by cystectomy.  After spending some time with him this morning and having a long discussion with patient and wife, I do not think he is a good candidate for any therapy: Surgery, chemo, or radiation.  He is quite frail and weak.  Wife states it is a struggle for him to transport from home.  Wife states she cannot imagine him coming in daily for radiation treatments or even weekly for chemo treatments.  They both seem to want cystectomy.  However, Dr. Harris saw him before me this morning and reports wife does not want him to go through surgery.  I told them both I think surgery would be very difficult to recover from.  I think likely he would be worse off by having surgery than if he began with a focus on comfort/palliative care.  Wife stated she did not want her  to feel like she was pushing him towards comfort care.  She states she supports him having surgery if that is what he wants to do.  Patient states he is firm in his decision that he wants surgery.  I told them I will leave this to them  and Dr. Harris to work out but I would recommend comfort care.    * Percutaneous nephrostomy tube on the right 5/3/2019    *Anemia  · On 4/30/2019, ferritin 649, but only 6% iron saturation.  Admitted due to hematuria.    *  Possible mild dementia    *  Presumed aspiration pneumonia    *Weight loss.  Lost 40 pounds since early January 2019.    *Poor performance status.  ECOG 4.     Recommendations  I recommend comfort care but patient is pushing for surgery.  We could consider palliative systemic therapy but he is frail with a poor performance status.    ECOG 4.    I would recommend against any systemic therapy either.  We do not yet know how beneficial immunotherapy is for people with a poor performance status.  This could be considered but likely would entail lots of trips to the office which would likely be quite draining on him.       Case discussed with Dr. Castro, who saw the patient yesterday.  Wife assisted with history.  40 minutes.  Over half that time counseling.

## 2019-05-06 NOTE — PLAN OF CARE
Problem: Patient Care Overview  Goal: Plan of Care Review  Outcome: Ongoing (interventions implemented as appropriate)   05/06/19 8162   Coping/Psychosocial   Plan of Care Reviewed With patient;spouse   Plan of Care Review   Progress declining   OTHER   Outcome Summary pt family to meet with palliative tomorrow; pt worked with PT today; irrigated Bey this afternoon; PO pain meds given for bladder pain and fullness; Some confusion noted- pt has a very flat affect; VSS;      Goal: Individualization and Mutuality  Outcome: Ongoing (interventions implemented as appropriate)    Goal: Discharge Needs Assessment  Outcome: Ongoing (interventions implemented as appropriate)    Goal: Interprofessional Rounds/Family Conf  Outcome: Ongoing (interventions implemented as appropriate)      Problem: Pneumonia (Adult)  Goal: Signs and Symptoms of Listed Potential Problems Will be Absent, Minimized or Managed (Pneumonia)  Outcome: Ongoing (interventions implemented as appropriate)

## 2019-05-06 NOTE — PROGRESS NOTES
"  [facility] Progress Note    Chief Complaint:  hematuria    Issues with cathteter last nite and nhad to have the rasheed replaced on Saturday.   Long talk with pt and wife. He is slow to comprehend the issues but I dotn think he is confused.   Wife seems frustrated with him and does not want hi to go thru surgery.  She is seeking POA.  NT draining     Review of Systems:    Review of systems could not be obtained due to  patient sedation status.          Vital Signs  /59 (BP Location: Left arm, Patient Position: Lying)   Pulse 78   Temp 97.8 °F (36.6 °C) (Oral)   Resp 18   Ht 177.8 cm (70\")   Wt 72.2 kg (159 lb 3.2 oz)   SpO2 94%   BMI 22.84 kg/m²     Physical Exam:     General Appearance:    Alert, cooperative, in no acute distress   Head:    Normocephalic, without obvious abnormality, atraumatic   Eyes:            Lids and lashes normal, conjunctivae and sclerae normal, no   icterus, no pallor, corneas clear, PERRLA   Ears:    Ears appear intact with no abnormalities noted   Throat:   No oral lesions, no thrush, oral mucosa moist   Neck:   No adenopathy, supple, trachea midline, no thyromegaly, no     carotid bruit, no JVD   Back:     No kyphosis present, no scoliosis present, no skin lesions,       erythema or scars, no tenderness to percussion or                   palpation,   range of motion normal   Lungs:     Clear to auscultation,respirations regular, even and                   unlabored    Heart:    Regular rhythm and normal rate, normal S1 and S2, no            murmur, no gallop, no rub, no click   Breast Exam:    Deferred   Abdomen:     Normal bowel sounds, no masses, no organomegaly, soft        non-tender, non-distended, no guarding, no rebound                 tenderness   Genitalia:    Rasheed anchored   Extremities:   Moves all extremities well, no edema, no cyanosis, no              redness   Pulses:   Pulses palpable and equal bilaterally   Skin:   No bleeding, bruising or rash   Lymph " nodes:   No palpable adenopathy   Neurologic:   Cranial nerves 2 - 12 grossly intact, sensation intact, DTR        present and equal bilaterally        Results Review:     I reviewed the patient's new clinical results.  Lab Results (last 24 hours)     Procedure Component Value Units Date/Time    POC Glucose Once [[id number]]  (Normal) Collected:  05/06/19 0722    Specimen:  Blood Updated:  05/06/19 0724     Glucose 102 mg/dL     Blood Culture - Blood, Hand, Right [[id number]] Collected:  04/30/19 2024    Specimen:  Blood from Hand, Right Updated:  05/05/19 2045     Blood Culture No growth at 5 days    Blood Culture - Blood, Hand, Right [[id number]] Collected:  04/30/19 2024    Specimen:  Blood from Hand, Right Updated:  05/05/19 2045     Blood Culture No growth at 5 days    POC Glucose Once [[id number]]  (Normal) Collected:  05/1940    Specimen:  Blood Updated:  05/05/19 1940     Glucose 113 mg/dL     POC Glucose Once [[id number]]  (Normal) Collected:  05/05/19 1651    Specimen:  Blood Updated:  05/05/19 1653     Glucose 118 mg/dL     POC Glucose Once [[id number]]  (Normal) Collected:  05/05/19 1132    Specimen:  Blood Updated:  05/05/19 1135     Glucose 118 mg/dL         Imaging Results (last 24 hours)     ** No results found for the last 24 hours. **          Medication Review:   I have personally reviewed    Current Facility-Administered Medications:   •  acetaminophen (TYLENOL) tablet 650 mg, 650 mg, Oral, Q4H PRN, Pamela Cronin MD  •  ceFOXITin (MEFOXIN) 2 g in 100 mL 0.9% Sodium Chloride IVPB ([facility]), 2 g, Intravenous, Q8H, Tyrese Moody MD, 2 g at 05/06/19 0516  •  dextrose (D50W) 25 g/ 50mL Intravenous Solution 25 g, 25 g, Intravenous, Q15 Min PRN, Pamela Cronin MD  •  dextrose (GLUTOSE) oral gel 15 g, 15 g, Oral, Q15 Min PRN, Pamela Cronin MD  •  famotidine (PEPCID) tablet 20 mg, 20 mg, Oral, BID, Tyrese Moody MD, 20 mg at 05/05/19 0128  •  glucagon (human recombinant) (GLUCAGEN DIAGNOSTIC)  injection 1 mg, 1 mg, Subcutaneous, PRN, Pamela Cronin MD  •  HYDROcodone-acetaminophen (NORCO) 7.5-325 MG per tablet 1 tablet, 1 tablet, Oral, Q6H PRN, Tyrese Moody MD, 1 tablet at 05/03/19 1710  •  HYDROmorphone (DILAUDID) injection 0.5 mg, 0.5 mg, Intravenous, Q2H PRN **AND** naloxone (NARCAN) injection 0.1 mg, 0.1 mg, Intravenous, Q5 Min PRN, Gregorio Harris MD  •  insulin lispro (humaLOG) injection 0-9 Units, 0-9 Units, Subcutaneous, 4x Daily With Meals & Nightly, Pamela Cronin MD  •  nitroglycerin (NITROSTAT) SL tablet 0.4 mg, 0.4 mg, Sublingual, Q5 Min PRN, Pamlea Cronin MD  •  ondansetron (ZOFRAN) injection 4 mg, 4 mg, Intravenous, Q6H PRN, Pamela Cronin MD  •  ondansetron (ZOFRAN) tablet 4 mg, 4 mg, Oral, Q6H PRN **OR** ondansetron (ZOFRAN) injection 4 mg, 4 mg, Intravenous, Q6H PRN, Tyrese Moody MD  •  oxyCODONE-acetaminophen (PERCOCET) 7.5-325 MG per tablet 1 tablet, 1 tablet, Oral, Q4H PRN, Gregorio Harris MD, 1 tablet at 05/06/19 0516  •  pravastatin (PRAVACHOL) tablet 40 mg, 40 mg, Oral, Daily, Pamela Cronin MD, 40 mg at 05/05/19 0836  •  sodium chloride 0.9 % flush 3 mL, 3 mL, Intravenous, Q12H, Pamela Cronin MD, 3 mL at 05/05/19 2007  •  sodium chloride 0.9 % flush 3-10 mL, 3-10 mL, Intravenous, PRN, Pamela Cronin MD    Allergies:    Amoxicillin    Assessment:    Active Problems:  Patient Active Problem List   Diagnosis   • Weight loss   • Aspiration pneumonia due to vomitus (CMS/HCC)   • Bladder mass   • Bladder mass   • Duodenitis   • Anemia   • Mediastinal lymphadenopathy   • Iron deficiency anemia due to chronic blood loss       Locally advanced TCC  Right Obstructive Uropathy    Plan:    Surgery will likely be palliative only and he will need chemo to attempt to get a cure.  He is a very poor surgical candidate with high risk of complications and prolonged hospital course. I have tried to steer him from this.  He may consider chemo but I think family wants to back  off.   We can ultimately try and internalize the stent and get his rasheed out.  He may require debulking of tumor further if no surgical intervention.      Gregorio Harris MD    5/6/2019  8:41 AM

## 2019-05-06 NOTE — PLAN OF CARE
Problem: Patient Care Overview  Goal: Plan of Care Review  Outcome: Ongoing (interventions implemented as appropriate)   05/06/19 0351   Coping/Psychosocial   Plan of Care Reviewed With patient   Plan of Care Review   Progress declining   OTHER   Outcome Summary Pt alert, forgetful at times, VSS. Pt c/o increased fatigue. Medicated as needed for pain. IV antibiotics administered per order. Will continue to monitor.        Problem: Pneumonia (Adult)  Goal: Signs and Symptoms of Listed Potential Problems Will be Absent, Minimized or Managed (Pneumonia)  Outcome: Ongoing (interventions implemented as appropriate)      Problem: Fall Risk (Adult)  Goal: Absence of Fall  Outcome: Ongoing (interventions implemented as appropriate)      Problem: Skin Injury Risk (Adult)  Goal: Skin Health and Integrity  Outcome: Ongoing (interventions implemented as appropriate)

## 2019-05-06 NOTE — PROGRESS NOTES
Progress Note    Name: Modesto Diego ADMIT: 2019   : 1940  PCP: Mg Root MD    MRN: [id number] LOS: 6 days   AGE/SEX: 78 y.o. male  ROOM: S618   Date of Encounter Visit: 19    Subjective:     Chief Complaint: f/u aspiration pneumonia and bladder cancer    Interval History: patient is still deciding on treatment plan and wants to be aggressive as he can to give himself more time. Still on IV abx. More alert and cognitive today. Pain well controlled and tolerating diet    REVIEW OF SYSTEMS:   CONSTITUTIONAL: no fever or chills  CARDIOVASCULAR: No chest pain, palpitations or edema.   RESPIRATORY: No shortness of breath. cough with no sputum production.   GASTROINTESTINAL: No anorexia, nausea, vomiting or diarrhea. Abdominal discomfort on right.   HEMATOLOGIC: No bleeding or bruising.     Objective:   Temp:  [97.8 °F (36.6 °C)-98.2 °F (36.8 °C)] 97.8 °F (36.6 °C)  Heart Rate:  [78-92] 78  Resp:  [18] 18  BP: (111-151)/(59-81) 111/59   SpO2:  [94 %] 94 %  on    Device (Oxygen Therapy): room air    Intake/Output Summary (Last 24 hours) at 2019 1342  Last data filed at 2019 1300  Gross per 24 hour   Intake 920 ml   Output 450 ml   Net 470 ml     Body mass index is 22.84 kg/m².      19  1846 19  1323 19  0539   Weight: 72.1 kg (159 lb) 72.1 kg (159 lb) 72.2 kg (159 lb 3.2 oz)     Weight change:     Physical Exam   Constitutional: He appears well-developed and well-nourished. No distress.   HENT:   Head: Normocephalic and atraumatic.   Eyes: Conjunctivae are normal. No scleral icterus.   Neck: Neck supple. No tracheal deviation present.   Cardiovascular: Normal rate, regular rhythm, normal heart sounds and intact distal pulses.   No murmur heard.  Pulmonary/Chest: Effort normal. He has no wheezes.   Diminished bases   Abdominal: Soft. Bowel sounds are normal. There is no tenderness.   Genitourinary:   Genitourinary Comments: Nephrostomy tube with mildly bloody urine    Musculoskeletal: He exhibits no edema.   Neurological: He is alert.   Oriented to person place and scenario   Skin: Skin is warm and dry. He is not diaphoretic.   Psychiatric: He has a normal mood and affect. His behavior is normal.   Nursing note and vitals reviewed.    Results Review:      Results from last 7 days   Lab Units 05/05/19  0503 05/02/19  0602 05/01/19  0539 04/30/19 2024   SODIUM mmol/L 137 141 137 136   POTASSIUM mmol/L 3.6 3.7 4.0 4.2   CHLORIDE mmol/L 106 110* 103 99   CO2 mmol/L 21.2* 21.6* 23.2 24.2   BUN mg/dL 20 22 30* 34*   CREATININE mg/dL 1.39* 1.21 1.47* 1.60*   GLUCOSE mg/dL 98 104* 116* 110*   CALCIUM mg/dL 10.3 9.9 10.6* 11.2*   AST (SGOT) U/L 13  --  16  --    ALT (SGPT) U/L 12  --  13  --      Estimated Creatinine Clearance: 43.3 mL/min (A) (by C-G formula based on SCr of 1.39 mg/dL (H)).  Results from last 7 days   Lab Units 05/01/19  0539   HEMOGLOBIN A1C % 5.20     Results from last 7 days   Lab Units 05/06/19  1116 05/06/19  0722 05/05/19  1938/19  1651 05/05/19  1132 05/05/19  0818 05/04/19  2033 05/04/19  1626   GLUCOSE mg/dL 116 102 113 118 118 106 118 116         Results from last 7 days   Lab Units 04/30/19  2024   PROBNP pg/mL 670.4     Results from last 7 days   Lab Units 05/02/19  0602   TSH mIU/mL 0.969           Invalid input(s):  PHOS        Invalid input(s): LDLCALC  Results from last 7 days   Lab Units 05/05/19  0503 05/03/19  1505 05/02/19  0602 05/01/19  0539 04/30/19 2024   WBC 10*3/mm3 12.52* 11.94* 10.74 12.68* 14.45*   HEMOGLOBIN g/dL 9.3* 9.8* 7.2* 8.3* 8.6*   HEMATOCRIT % 29.0* 30.0* 22.4* 26.6* 27.8*   PLATELETS 10*3/mm3 351 362 320 334 345   MCV fL 86.1 84.5 83.6 84.7 85.5   MCH pg 27.6 27.6 26.9 26.4* 26.5*   MCHC g/dL 32.1 32.7 32.1 31.2* 30.9*   RDW % 15.4 14.8 15.0 14.9 14.8   RDW-SD fl 48.1 45.7 45.5 45.6 46.8   MPV fL 8.6 8.7 9.1 9.0 9.3   NEUTROPHIL % %  --   --   --  89.7* 90.1*   LYMPHOCYTE % %  --   --   --  3.3* 3.5*   MONOCYTES % %  --    --   --  6.1 5.5   EOSINOPHIL % %  --   --   --  0.1* 0.1*   BASOPHIL % %  --   --   --  0.2 0.2   IMM GRAN % %  --   --   --  0.6* 0.6*   NEUTROS ABS 10*3/mm3  --   --   --  11.38* 13.02*   LYMPHS ABS 10*3/mm3  --   --   --  0.42* 0.51*   MONOS ABS 10*3/mm3  --   --   --  0.77 0.80   EOS ABS 10*3/mm3  --   --   --  0.01 0.01   BASOS ABS 10*3/mm3  --   --   --  0.02 0.03   IMMATURE GRANS (ABS) 10*3/mm3  --   --   --  0.08* 0.08*   NRBC /100 WBC  --   --   --  0.0 0.0     Results from last 7 days   Lab Units 05/03/19  1034 04/30/19 2024   INR  1.32* 1.24*   APTT seconds 33.8  --          Results from last 7 days   Lab Units 04/30/19 2024   PROCALCITONIN ng/mL 0.19   LACTATE mmol/L 1.3             Results from last 7 days   Lab Units 04/30/19 2024 04/30/19 1959   BLOODCX  No growth at 5 days  No growth at 5 days  --    URINECX   --  <25,000 CFU/mL Mixed Clementina Isolated         Results from last 7 days   Lab Units 04/30/19 1959   NITRITE UA  Negative   WBC UA /HPF Too Numerous to Count*   BACTERIA UA /HPF None Seen   SQUAM EPITHEL UA /HPF 0-2   URINECX  <25,000 CFU/mL Mixed Clementina Isolated           Imaging:  Imaging Results (last 24 hours)     ** No results found for the last 24 hours. **          I reviewed the patient's new clinical results and medications.         Medication Review:   Scheduled Meds:  cefOXitin 2 g Intravenous Q8H   famotidine 20 mg Oral BID   insulin lispro 0-9 Units Subcutaneous 4x Daily With Meals & Nightly   pravastatin 40 mg Oral Daily   sodium chloride 3 mL Intravenous Q12H     Continuous Infusions:   PRN Meds:.•  acetaminophen  •  dextrose  •  dextrose  •  glucagon (human recombinant)  •  HYDROcodone-acetaminophen  •  HYDROmorphone **AND** naloxone  •  nitroglycerin  •  ondansetron  •  ondansetron **OR** ondansetron  •  oxyCODONE-acetaminophen  •  sodium chloride    Problem List:     Active Hospital Problems    Diagnosis  POA   • **Aspiration pneumonia due to vomitus (CMS/HCC) [J69.0]   Unknown   • Mediastinal lymphadenopathy [R59.0]  Unknown   • Iron deficiency anemia due to chronic blood loss [D50.0]  Unknown   • Weight loss [R63.4]  Unknown   • Bladder mass [N32.89]  Unknown   • Bladder mass [N32.89]  Unknown   • Duodenitis [K29.80]  Unknown   • Anemia [D64.9]  Unknown      Resolved Hospital Problems   No resolved problems to display.       Assessment and Plan:     1. Aspiration pneumonia- on room air. Stop IV cefoxitin and start omnicef 300 BID for 2 more days. (would avoid augmentin given hives reaction with amoxicillin and avoid clindamycin given risk of diarrhea and duodenitis)   2. Bladder cancer with R ureteral obstruction from mass - s/p nephrostomy tube. reviewed surgery and oncology recommendations- see below  3. Duodenitis- no further workup planned. continue omeprazole.   4. Iron deficiency anemia- likely from blood loss related to hematuria from bladder cancer. s/p 2 RBC's on 5/2/19. Stable hgb today.     VTE prophylaxis: SCDs   CODE status: full code at this point  Disposition: likely home with \[facility]\"" in 1-2 days     I discussed the patients findings and my recommendations with patient and family. Dr. Moody ws present at the end of my exam and reviewed with patient and family again. He is a poor candidate for surgery with high risk for complications and would only be for palliation and not cure. Chemotherapy and radiation would only be for palliation as well and this was discussed in detail with patient and family. Could consider second opinion if patient would like, but discussed that likely would have same input given that oncology and urology feel this would be on palliation treatment given the size of the mass. Discussed with CCP and will have \[facility]\"" come back by today and get preparations together to go home with [facility].     Shanta Barnes, APRN  05/06/19  1:42 PM

## 2019-05-06 NOTE — NURSING NOTE
Spoke with [facility]- they will be at room at 1100 tomorrow 5/6/19 to meet with patient and family.    ABIGAIL King RN

## 2019-05-06 NOTE — THERAPY TREATMENT NOTE
Acute Care - Physical Therapy Treatment Note  [facility]     Patient Name: Modesto Diego  : 1940  MRN: [id number]  Today's Date: 2019  Onset of Illness/Injury or Date of Surgery: 19     Referring Physician: Fransisco    Admit Date: 2019    Visit Dx:    ICD-10-CM ICD-9-CM   1. Bladder mass N32.89 596.89   2. Unsteadiness on feet R26.81 781.2     Patient Active Problem List   Diagnosis   • Weight loss   • Aspiration pneumonia due to vomitus (CMS/HCC)   • Bladder mass   • Bladder mass   • Duodenitis   • Anemia   • Mediastinal lymphadenopathy   • Iron deficiency anemia due to chronic blood loss       Therapy Treatment    Rehabilitation Treatment Summary     Row Name 19 1640             Treatment Time/Intention    Discipline  physical therapist  -MS      Document Type  therapy note (daily note)  -MS      Subjective Information  complains of;weakness;fatigue  -MS      Mode of Treatment  physical therapy;individual therapy  -MS      Patient Effort  good  -MS      Existing Precautions/Restrictions  fall  (Significant)  Exit alarm  -MS      Recorded by [MS] Tony Burnham, PT 19 1642      Row Name 19 1640             Cognitive Assessment/Intervention- PT/OT    Orientation Status (Cognition)  oriented to;person  -MS      Follows Commands (Cognition)  follows one step commands;WNL  -MS      Personal Safety Interventions  fall prevention program maintained;gait belt;nonskid shoes/slippers when out of bed;supervised activity  -MS      Recorded by [MS] Tony Burnham, PT 19 1642      Row Name 19 1640             Bed Mobility Assessment/Treatment    Supine-Sit [place] (Bed Mobility)  minimum assist (75% patient effort);2 person assist  -MS      Sit-Supine [place] (Bed Mobility)  minimum assist (75% patient effort)  -MS      Recorded by [MS] Tony Burnham, PT 19 1642      Row Name 19 1640             Sit-Stand Transfer    Sit-Stand [place]  (Transfers)  minimum assist (75% patient effort)  -MS      Assistive Device (Sit-Stand Transfers)  walker, front-wheeled  -MS      Recorded by [MS] BurnhamTony shukla, PT 05/06/19 1642      Row Name 05/06/19 1640             Stand-Sit Transfer    Stand-Sit [place] (Transfers)  minimum assist (75% patient effort)  -MS      Assistive Device (Stand-Sit Transfers)  walker, front-wheeled  -MS      Recorded by [MS] BurnhamTony shukla, PT 05/06/19 1642      Row Name 05/06/19 1640             Gait/Stairs Assessment/Training    [place] Level (Gait)  minimum assist (75% patient effort);2 person assist  -MS      Assistive Device (Gait)  walker, front-wheeled  -MS      Distance in Feet (Gait)  100 feet  -MS      Pattern (Gait)  step-through  -MS      Deviations/Abnormal Patterns (Gait)  stride length decreased;gait speed decreased  -MS      Bilateral Gait Deviations  forward flexed posture  -MS      Comment (Gait/Stairs)  Verbal/tactile cues for posture correction and for Rwx guidance.  Pt. limited by overall fatigue this PM.  -MS      Recorded by [MS] BurnhamTony shukla, PT 05/06/19 1642      Row Name 05/06/19 1640             Positioning and Restraints    Pre-Treatment Position  in bed  -MS      Post Treatment Position  bed  -MS      In Bed  notified nsg;supine;call light within reach;encouraged to call for assist;exit alarm on;with family/caregiver All lines intact.  -MS      Recorded by [MS] Tony Burnham, PT 05/06/19 1642      Row Name 05/06/19 1640             Pain Scale: Numbers Pre/Post-Treatment    Pain Scale: Numbers, Pretreatment  0/10 - no pain  -MS      Pain Scale: Numbers, Post-Treatment  0/10 - no pain  -MS      Recorded by [MS] BurnhamTony shukla, PT 05/06/19 1642        User Key  (r) = Recorded By, (t) = Taken By, (c) = Cosigned By    Initials Name Effective Dates Discipline    Sharron Stovallsyed CHOW, PT 04/03/18 -  PT                   Physical Therapy Education     Title: PT OT SLP Therapies (Done)      Topic: Physical Therapy (Done)     Point: Mobility training (Done)     Learning Progress Summary           Patient Acceptance, D,E, VU,NR by MS at 5/6/2019  4:42 PM    Acceptance, E, VU by CH at 5/5/2019  2:21 PM    Acceptance, E,TB, VU by CS at 5/4/2019  3:58 PM    Acceptance, E,TB, VU,DU by CW at 5/3/2019 10:18 AM    Acceptance, E,TB,D, VU,NR by  at 5/1/2019  1:57 PM                   Point: Home exercise program (Done)     Learning Progress Summary           Patient Acceptance, D,E, VU,NR by MS at 5/6/2019  4:42 PM    Acceptance, E, VU by CH at 5/5/2019  2:21 PM    Acceptance, E,TB, VU by CS at 5/4/2019  3:58 PM    Acceptance, E,TB, VU,DU by CW at 5/3/2019 10:18 AM    Acceptance, E,TB,D, VU,NR by  at 5/1/2019  1:57 PM                   Point: Body mechanics (Done)     Learning Progress Summary           Patient Acceptance, D,E, VU,NR by MS at 5/6/2019  4:42 PM    Acceptance, E, VU by  at 5/5/2019  2:21 PM    Acceptance, E,TB, VU by CS at 5/4/2019  3:58 PM    Acceptance, E,TB, VU,DU by  at 5/3/2019 10:18 AM    Acceptance, E,TB,D, VU,NR by  at 5/1/2019  1:57 PM                   Point: Precautions (Done)     Learning Progress Summary           Patient Acceptance, D,E, VU,NR by MS at 5/6/2019  4:42 PM    Acceptance, E, VU by  at 5/5/2019  2:21 PM    Acceptance, E,TB, VU by  at 5/4/2019  3:58 PM    Acceptance, E,TB, VU,DU by  at 5/3/2019 10:18 AM                               User Key     Initials Effective Dates Name Provider Type Discipline     04/03/18 -  Umu Monroy, PT Physical Therapist PT    MS 04/03/18 -  Tony Burnham, PT Physical Therapist PT     03/07/18 -  Arsalan Khan, PTA Physical Therapy Assistant PT     04/03/18 -  Mat Reid, PT Physical Therapist PT    CS 05/14/18 -  Isma Perry, PT Physical Therapist PT                PT Recommendation and Plan     Plan of Care Reviewed With: patient, family  Outcome Summary: Pt. requires Min. assist for bed mobility,  transfers, and gait this day. Pt. able to ambulate 100 feet with use of Rwx but limited overall by fatigue, weakness.  Verbal/tactile cues given throughout ambulation for posture correction and for Rwx guidance.   Outcome Measures     Row Name 05/06/19 1600 05/05/19 1400 05/04/19 1500       How much help from another person do you currently need...    Turning from your back to your side while in flat bed without using bedrails?  3  -MS  4  -CH  4  -CS    Moving from lying on back to sitting on the side of a flat bed without bedrails?  2  -MS  3  -CH  4  -CS    Moving to and from a bed to a chair (including a wheelchair)?  3  -MS  3  -CH  3  -CS    Standing up from a chair using your arms (e.g., wheelchair, bedside chair)?  2  -MS  3  -CH  3  -CS    Climbing 3-5 steps with a railing?  2  -MS  2  -CH  2  -CS    To walk in hospital room?  3  -MS  3  -CH  3  -CS    AM-PAC 6 Clicks Score  15  -MS  18  -CH  19  -CS       Functional Assessment    Outcome Measure Options  AM-PAC 6 Clicks Basic Mobility (PT)  -MS  AM-PAC 6 Clicks Basic Mobility (PT)  -CH  AM-PAC 6 Clicks Basic Mobility (PT)  -CS      User Key  (r) = Recorded By, (t) = Taken By, (c) = Cosigned By    Initials Name Provider Type    Tony Stovall, PT Physical Therapist    CH Mat Reid, PT Physical Therapist    CS Isma Perry, PT Physical Therapist         Time Calculation:   PT Charges     Row Name 05/06/19 1644             Time Calculation    Start Time  1557  -MS      Stop Time  1615  -MS      Time Calculation (min)  18 min  -MS      PT Received On  05/06/19  -MS      PT - Next Appointment  05/07/19  -MS         Time Calculation- PT    Total Timed Code Minutes- PT  15 minute(s)  -MS        User Key  (r) = Recorded By, (t) = Taken By, (c) = Cosigned By    Initials Name Provider Type    MS Tony Burnham, PT Physical Therapist        Therapy Charges for Today     Code Description Service Date Service Provider Modifiers Qty    55714792254   PT THER PROC EA 15 MIN 5/6/2019 Tony Burnham, PT GP 1    [id number]  PT THER SUPP EA 15 MIN 5/6/2019 Tony Burnham, PT GP 1          PT G-Codes  Outcome Measure Options: AM-PAC 6 Clicks Basic Mobility (PT)  AM-PAC 6 Clicks Score: 15    Tony Burnham, PT  5/6/2019

## 2019-05-06 NOTE — PROGRESS NOTES
Continued Stay Note  [facility]     Patient Name: Modesto Diego  MRN: [id number]  Today's Date: 5/6/2019    Admit Date: 4/30/2019    Discharge Plan     Row Name 05/06/19 1445       Plan    Plan  Awaiting decision re: [facility] or surgery    Patient/Family in Agreement with Plan  yes    Plan Comments  Spoke with admitting MD - he states to notify [facility] that patient and wife are leaning toward [facility] at DC - placed call and they are continuing to follow.  CCP will F/U with patient and wife in a.m. BHum[place] RN Becky S. [place], RN

## 2019-05-07 NOTE — PLAN OF CARE
Problem: Patient Care Overview  Goal: Plan of Care Review  Outcome: Ongoing (interventions implemented as appropriate)   05/07/19 1839   Coping/Psychosocial   Plan of Care Reviewed With patient;spouse   Plan of Care Review   Progress improving   OTHER   Outcome Summary Pt c/o of pain once this shift. Treated w/ PRN meds. R nephrostomy stent place, drainage bag removed. Covered w/ gauze and tegaderm. Dressing CDI. F/C removed per Dr. Harris's order. Removed at 1630, voiding trial in place. Tolerating oral meds. ACHS. Up w/ assist x's 1 to BSC. D/C tomorrow home w/ [facility]. VSS. Will continue to monitor.        Problem: Pneumonia (Adult)  Goal: Signs and Symptoms of Listed Potential Problems Will be Absent, Minimized or Managed (Pneumonia)  Outcome: Ongoing (interventions implemented as appropriate)   05/07/19 1839   Goal/Outcome Evaluation   Problems Assessed (Pneumonia) all   Problems Present (Pneumonia) none       Problem: Urine Elimination Impaired (Adult)  Goal: Effective or Improved Urinary Elimination  Outcome: Ongoing (interventions implemented as appropriate)   05/07/19 1839   Urine Elimination Impaired (Adult)   Effective or Improved Urinary Elimination making progress toward outcome     Goal: Effective Containment of Urine  Outcome: Ongoing (interventions implemented as appropriate)   05/07/19 1839   Urine Elimination Impaired (Adult)   Effective Containment of Urine making progress toward outcome     Goal: Reduced Incontinence Episodes  Outcome: Ongoing (interventions implemented as appropriate)   05/07/19 1839   Urine Elimination Impaired (Adult)   Reduced Incontinence Episodes making progress toward outcome       Problem: Fall Risk (Adult)  Goal: Absence of Fall  Outcome: Ongoing (interventions implemented as appropriate)   05/07/19 1839   Fall Risk (Adult)   Absence of Fall making progress toward outcome       Problem: Skin Injury Risk (Adult)  Goal: Skin Health and Integrity  Outcome: Ongoing  (interventions implemented as appropriate)   05/07/19 1839   Skin Injury Risk (Adult)   Skin Health and Integrity making progress toward outcome       Problem: Nutrition, Imbalanced: Inadequate Oral Intake (Adult)  Goal: Improved Oral Intake  Outcome: Ongoing (interventions implemented as appropriate)   05/07/19 1839   Nutrition, Imbalanced: Inadequate Oral Intake (Adult)   Improved Oral Intake making progress toward outcome     Goal: Prevent Further Weight Loss  Outcome: Ongoing (interventions implemented as appropriate)   05/07/19 1839   Nutrition, Imbalanced: Inadequate Oral Intake (Adult)   Prevent Further Weight Loss making progress toward outcome

## 2019-05-07 NOTE — PROGRESS NOTES
"  First Urology Progress Note    Chief Complaint:  Bladder pain    Seems to understand things better, thought they were going to do a short course of radiation therapy but that is not the case.  IR to try and internalize stent today    Review of Systems:    Review of systems could not be obtained due to  patient confusion.          Vital Signs  /75 (BP Location: Left arm, Patient Position: Lying)   Pulse 80   Temp 98.2 °F (36.8 °C) (Oral)   Resp 16   Ht 177.8 cm (70\")   Wt 72.2 kg (159 lb 3.2 oz)   SpO2 92%   BMI 22.84 kg/m²     Physical Exam:     General Appearance:    Alert, cooperative, in no acute distress   Head:    Normocephalic, without obvious abnormality, atraumatic   Eyes:            Lids and lashes normal, conjunctivae and sclerae normal, no   icterus, no pallor, corneas clear, PERRLA   Ears:    Ears appear intact with no abnormalities noted   Throat:   No oral lesions, no thrush, oral mucosa moist   Neck:   No adenopathy, supple, trachea midline, no thyromegaly, no   carotid bruit, no JVD   Back:     No kyphosis present, no scoliosis present, no skin lesions,      erythema or scars, no tenderness to percussion or                   palpation,   range of motion normal   Lungs:     Clear to auscultation,respirations regular, even and                  unlabored    Heart:    Regular rhythm and normal rate, normal S1 and S2, no            murmur, no gallop, no rub, no click   Chest Wall:    No abnormalities observed   Abdomen:     Normal bowel sounds, no masses, no organomegaly, soft        non-tender, non-distended, no guarding, no rebound                tenderness   Rectal:     Deferred   Extremities:   Moves all extremities well, no edema, no cyanosis, no             redness   Pulses:   Pulses palpable and equal bilaterally   Skin:   No bleeding, bruising or rash   Lymph nodes:   No palpable adenopathy   Neurologic:   Cranial nerves 2 - 12 grossly intact, sensation intact, DTR       present and " equal bilaterally        Results Review:     I reviewed the patient's new clinical results.  Lab Results (last 24 hours)     Procedure Component Value Units Date/Time    POC Glucose Once [[id number]]  (Abnormal) Collected:  05/07/19 1259    Specimen:  Blood Updated:  05/07/19 1301     Glucose 136 mg/dL     POC Glucose Once [[id number]]  (Normal) Collected:  05/07/19 0812    Specimen:  Blood Updated:  05/07/19 0817     Glucose 89 mg/dL     POC Glucose Once [[id number]]  (Normal) Collected:  05/06/19 2013    Specimen:  Blood Updated:  05/06/19 2014     Glucose 107 mg/dL     POC Glucose Once [[id number]]  (Normal) Collected:  05/06/19 1615    Specimen:  Blood Updated:  05/06/19 1619     Glucose 93 mg/dL         Imaging Results (last 24 hours)     ** No results found for the last 24 hours. **          Medication Review:   I have personally reviewed    Current Facility-Administered Medications:   •  acetaminophen (TYLENOL) tablet 650 mg, 650 mg, Oral, Q4H PRN, Pamela Cronin MD  •  cefdinir (OMNICEF) capsule 300 mg, 300 mg, Oral, Q12H, Shanta Barnes APRN, 300 mg at 05/07/19 0947  •  dextrose (D50W) 25 g/ 50mL Intravenous Solution 25 g, 25 g, Intravenous, Q15 Min PRN, Pamela Cronin MD  •  dextrose (GLUTOSE) oral gel 15 g, 15 g, Oral, Q15 Min PRN, Pamela Cronin MD  •  famotidine (PEPCID) tablet 20 mg, 20 mg, Oral, BID, Tyrese Moody MD, 20 mg at 05/07/19 0947  •  glucagon (human recombinant) (GLUCAGEN DIAGNOSTIC) injection 1 mg, 1 mg, Subcutaneous, PRN, Pamela Cronin MD  •  HYDROcodone-acetaminophen (NORCO) 7.5-325 MG per tablet 1 tablet, 1 tablet, Oral, Q6H PRN, Tyrese Moody MD, 1 tablet at 05/07/19 1153  •  HYDROmorphone (DILAUDID) injection 0.5 mg, 0.5 mg, Intravenous, Q2H PRN **AND** naloxone (NARCAN) injection 0.1 mg, 0.1 mg, Intravenous, Q5 Min PRN, Gregorio Harris MD  •  insulin lispro (humaLOG) injection 0-9 Units, 0-9 Units, Subcutaneous, 4x Daily With Meals & Nightly, Pamela Cronin MD  •   nitroglycerin (NITROSTAT) SL tablet 0.4 mg, 0.4 mg, Sublingual, Q5 Min PRN, Pamela Cronin MD  •  ondansetron (ZOFRAN) injection 4 mg, 4 mg, Intravenous, Q6H PRN, Pamela Cronin MD  •  ondansetron (ZOFRAN) tablet 4 mg, 4 mg, Oral, Q6H PRN **OR** ondansetron (ZOFRAN) injection 4 mg, 4 mg, Intravenous, Q6H PRN, Tyrese Moody MD  •  oxyCODONE-acetaminophen (PERCOCET) 7.5-325 MG per tablet 1 tablet, 1 tablet, Oral, Q4H PRN, Gregorio Harris MD, 1 tablet at 05/06/19 0516  •  pravastatin (PRAVACHOL) tablet 40 mg, 40 mg, Oral, Daily, Pamela Cronin MD, 40 mg at 05/07/19 0946  •  sodium chloride 0.9 % flush 3 mL, 3 mL, Intravenous, Q12H, Pamela Cronin MD, 3 mL at 05/07/19 0947  •  sodium chloride 0.9 % flush 3-10 mL, 3-10 mL, Intravenous, PRN, Pamela Cronin MD    Allergies:    Amoxicillin    Assessment:    Active Problems:  Patient Active Problem List   Diagnosis   • Weight loss   • Aspiration pneumonia due to vomitus (CMS/HCC)   • Bladder mass   • Bladder mass   • Duodenitis   • Anemia   • Mediastinal lymphadenopathy   • Iron deficiency anemia due to chronic blood loss       Locally advance bladder cancer    Plan:    We will attempt to internalize stent today and if not able then pull the NT.  Voiding trial again and if fails replace rasheed catheter.  Home tomorrow with \[facility]\""         Gregorio Harris MD    5/7/2019  1:15 PM

## 2019-05-07 NOTE — NURSING NOTE
I met with this pt and his wife at bedside. Pt is having a IR Nephrostomy Procedures in Radiology today. Pts nurse reports no time set for procedure yet.  Plan is for [facility] nurse to return in the morning to assist with discharge home.

## 2019-05-07 NOTE — THERAPY TREATMENT NOTE
Acute Care - Physical Therapy Treatment Note  [facility]     Patient Name: Modesto Diego  : 1940  MRN: [id number]  Today's Date: 2019  Onset of Illness/Injury or Date of Surgery: 19     Referring Physician: Fransisco    Admit Date: 2019    Visit Dx:    ICD-10-CM ICD-9-CM   1. Bladder mass N32.89 596.89   2. Unsteadiness on feet R26.81 781.2     Patient Active Problem List   Diagnosis   • Weight loss   • Aspiration pneumonia due to vomitus (CMS/HCC)   • Bladder mass   • Bladder mass   • Duodenitis   • Anemia   • Mediastinal lymphadenopathy   • Iron deficiency anemia due to chronic blood loss       Therapy Treatment    Rehabilitation Treatment Summary     Row Name 19 0848             Treatment Time/Intention    Discipline  physical therapist  -MS      Document Type  therapy note (daily note)  -MS      Subjective Information  complains of;fatigue;weakness  -MS      Mode of Treatment  physical therapy;individual therapy  -MS      Patient Effort  good  -MS      Comment  Pt.'s only complaint this AM is feeling fatigued. Otherwise, pt. agreeable to work with P.T.  -MS      Existing Precautions/Restrictions  fall  (Significant)  Exit alarm; Nephrostomy tube; Bey Catheter  -MS      Recorded by [MS] Tony Burnham, PT 19 0854      Row Name 19 0848             Cognitive Assessment/Intervention- PT/OT    Orientation Status (Cognition)  oriented to;person  -MS      Follows Commands (Cognition)  follows one step commands;WNL  -MS      Personal Safety Interventions  fall prevention program maintained;gait belt;nonskid shoes/slippers when out of bed;supervised activity  -MS      Recorded by [MS] Tony Burnham, PT 19 0854      Row Name 19 0848             Bed Mobility Assessment/Treatment    Supine-Sit [place] (Bed Mobility)  contact guard  -MS      Sit-Supine [place] (Bed Mobility)  contact guard  -MS2      Assistive Device (Bed Mobility)  bed rails  -MS2       Recorded by [MS] Tony Burnham, PT 05/07/19 0854  [MS2] Tony Burnham, PT 05/07/19 0856      Row Name 05/07/19 0848             Sit-Stand Transfer    Sit-Stand [place] (Transfers)  contact guard;2 person assist  -MS      Assistive Device (Sit-Stand Transfers)  walker, front-wheeled  -MS      Recorded by [MS] Tony Burnham, PT 05/07/19 0856      Row Name 05/07/19 0848             Stand-Sit Transfer    Stand-Sit [place] (Transfers)  contact guard;2 person assist  -MS      Assistive Device (Stand-Sit Transfers)  walker, front-wheeled  -MS      Recorded by [MS] Tony Burnham, PT 05/07/19 0856      Row Name 05/07/19 0848             Gait/Stairs Assessment/Training    [place] Level (Gait)  minimum assist (75% patient effort);2 person assist  -MS      Assistive Device (Gait)  walker, front-wheeled  -MS      Distance in Feet (Gait)  130 feet  -MS      Pattern (Gait)  step-through  -MS      Deviations/Abnormal Patterns (Gait)  stride length decreased  -MS      Comment (Gait/Stairs)  Bilateral knee flexion throughout gait cycle.  Verbal/tactile cues for posture correction and Rwx guidance.  -MS      Recorded by [MS] Tony Burnham, PT 05/07/19 0856      Row Name 05/07/19 0848             Therapeutic Exercise    Comment (Therapeutic Exercise)  BLE ther. ex. program x 15 reps completed (Ankle pumps, Hip Flexion, LAQ's)  -MS      Recorded by [MS] Tony Burnham, PT 05/07/19 0856      Row Name 05/07/19 0848             Positioning and Restraints    Pre-Treatment Position  in bed  -MS      Post Treatment Position  bed  -MS      In Bed  notified nsg;supine;call light within reach;encouraged to call for assist;exit alarm on;with family/caregiver All lines intact.  -MS      Recorded by [MS] Tony Burnham, PT 05/07/19 0856      Row Name 05/07/19 0848             Pain Scale: Numbers Pre/Post-Treatment    Pain Scale: Numbers, Pretreatment  0/10 - no pain  -MS      Pain Scale: Numbers,  Post-Treatment  0/10 - no pain  -MS      Recorded by [MS] Tony Burnham, PT 05/07/19 0856        User Key  (r) = Recorded By, (t) = Taken By, (c) = Cosigned By    Initials Name Effective Dates Discipline    Tony Stovall, PT 04/03/18 -  PT                   Physical Therapy Education     Title: PT OT SLP Therapies (Done)     Topic: Physical Therapy (Done)     Point: Mobility training (Done)     Learning Progress Summary           Patient Acceptance, E,D, VU,NR by MS at 5/7/2019  8:56 AM    Acceptance, D,E, VU,NR by MS at 5/6/2019  4:42 PM    Acceptance, E, VU by CH at 5/5/2019  2:21 PM    Acceptance, E,TB, VU by CS at 5/4/2019  3:58 PM    Acceptance, E,TB, VU,DU by CW at 5/3/2019 10:18 AM    Acceptance, E,TB,D, VU,NR by EE at 5/1/2019  1:57 PM                   Point: Home exercise program (Done)     Learning Progress Summary           Patient Acceptance, E,D, VU,NR by MS at 5/7/2019  8:56 AM    Acceptance, D,E, VU,NR by MS at 5/6/2019  4:42 PM    Acceptance, E, VU by CH at 5/5/2019  2:21 PM    Acceptance, E,TB, VU by CS at 5/4/2019  3:58 PM    Acceptance, E,TB, VU,DU by CW at 5/3/2019 10:18 AM    Acceptance, E,TB,D, VU,NR by EE at 5/1/2019  1:57 PM                   Point: Body mechanics (Done)     Learning Progress Summary           Patient Acceptance, E,D, VU,NR by MS at 5/7/2019  8:56 AM    Acceptance, D,E, VU,NR by MS at 5/6/2019  4:42 PM    Acceptance, E, VU by CH at 5/5/2019  2:21 PM    Acceptance, E,TB, VU by CS at 5/4/2019  3:58 PM    Acceptance, E,TB, VU,DU by CW at 5/3/2019 10:18 AM    Acceptance, E,TB,D, VU,NR by EE at 5/1/2019  1:57 PM                   Point: Precautions (Done)     Learning Progress Summary           Patient Acceptance, E,D, VU,NR by MS at 5/7/2019  8:56 AM    Acceptance, D,E, VU,NR by MS at 5/6/2019  4:42 PM    Acceptance, E, VU by CH at 5/5/2019  2:21 PM    Acceptance, E,TB, VU by CS at 5/4/2019  3:58 PM    Acceptance, E,TB, VU,DU by CW at 5/3/2019 10:18 AM                                User Key     Initials Effective Dates Name Provider Type Discipline    EE 04/03/18 -  Umu Monroy, PT Physical Therapist PT    MS 04/03/18 -  Tony Burnham, PT Physical Therapist PT    CW 03/07/18 -  Arsalan Khan, PTA Physical Therapy Assistant PT    CH 04/03/18 -  Mat Reid, PT Physical Therapist PT    CS 05/14/18 -  Isma Perry, PT Physical Therapist PT                PT Recommendation and Plan     Plan of Care Reviewed With: patient  Progress: improving  Outcome Summary: Improved tolerance to functional activity this day with an increase in gait distance and progression of ther. ex. protocol.   Outcome Measures     Row Name 05/07/19 0800 05/06/19 1600 05/05/19 1400       How much help from another person do you currently need...    Turning from your back to your side while in flat bed without using bedrails?  4  -MS  3  -MS  4  -CH    Moving from lying on back to sitting on the side of a flat bed without bedrails?  3  -MS  2  -MS  3  -CH    Moving to and from a bed to a chair (including a wheelchair)?  2  -MS  3  -MS  3  -CH    Standing up from a chair using your arms (e.g., wheelchair, bedside chair)?  3  -MS  2  -MS  3  -CH    Climbing 3-5 steps with a railing?  2  -MS  2  -MS  2  -CH    To walk in hospital room?  3  -MS  3  -MS  3  -CH    AM-PAC 6 Clicks Score  17  -MS  15  -MS  18  -CH       Functional Assessment    Outcome Measure Options  AM-PAC 6 Clicks Basic Mobility (PT)  -MS  AM-PAC 6 Clicks Basic Mobility (PT)  -MS  AM-PAC 6 Clicks Basic Mobility (PT)  -CH    Row Name 05/04/19 1500             How much help from another person do you currently need...    Turning from your back to your side while in flat bed without using bedrails?  4  -CS      Moving from lying on back to sitting on the side of a flat bed without bedrails?  4  -CS      Moving to and from a bed to a chair (including a wheelchair)?  3  -CS      Standing up from a chair using your arms (e.g.,  wheelchair, bedside chair)?  3  -CS      Climbing 3-5 steps with a railing?  2  -CS      To walk in hospital room?  3  -CS      AM-PAC 6 Clicks Score  19  -CS         Functional Assessment    Outcome Measure Options  AM-PAC 6 Clicks Basic Mobility (PT)  -CS        User Key  (r) = Recorded By, (t) = Taken By, (c) = Cosigned By    Initials Name Provider Type    Tony Stovall, PT Physical Therapist    Mat Chavarria, PT Physical Therapist    CS Isma Perry PT Physical Therapist         Time Calculation:   PT Charges     Row Name 05/07/19 0858             Time Calculation    Start Time  0830  -MS      Stop Time  0850  -MS      Time Calculation (min)  20 min  -MS      PT Received On  05/07/19  -MS      PT - Next Appointment  05/08/19  -MS         Time Calculation- PT    Total Timed Code Minutes- PT  17 minute(s)  -MS        User Key  (r) = Recorded By, (t) = Taken By, (c) = Cosigned By    Initials Name Provider Type    MS Burnham Tony CHOW, PT Physical Therapist        Therapy Charges for Today     Code Description Service Date Service Provider Modifiers Qty    55498866583 HC PT THER PROC EA 15 MIN 5/6/2019 Tony Burnham, PT GP 1    87712624848 HC PT THER SUPP EA 15 MIN 5/6/2019 Tony Burnham, PT GP 1    74117229604 HC PT THER PROC EA 15 MIN 5/7/2019 Tony Burnham, PT GP 1    26504013000 HC PT THER SUPP EA 15 MIN 5/7/2019 Tony Burnham, PT GP 1          PT G-Codes  Outcome Measure Options: AM-PAC 6 Clicks Basic Mobility (PT)  AM-PAC 6 Clicks Score: 17    Tony Burnham, PT  5/7/2019

## 2019-05-07 NOTE — PROGRESS NOTES
Progress Note    Name: Modesto Diego ADMIT: 2019   : 1940  PCP: Mg Root MD    MRN: [id number] LOS: 7 days   AGE/SEX: 78 y.o. male  ROOM: S618   Date of Encounter Visit: 19    Subjective:     Chief Complaint: f/u aspiration pneumonia and bladder cancer    Interval History: switched to oral abx yesterday with no worsening of symptoms. Darker urine today. Plans for nephrostomy tube removal. Family met with [facility] team.     REVIEW OF SYSTEMS:   CONSTITUTIONAL: no fever or chills  CARDIOVASCULAR: No chest pain, palpitations or edema.   RESPIRATORY: No shortness of breath. cough with no sputum production.   GASTROINTESTINAL: No anorexia, nausea, vomiting or diarrhea. Right flank pain at nephrostomy tube. Decreased oral intake last night.   HEMATOLOGIC: No bleeding or bruising.     Objective:   Temp:  [98.2 °F (36.8 °C)-98.4 °F (36.9 °C)] 98.2 °F (36.8 °C)  Heart Rate:  [80-83] 80  Resp:  [16] 16  BP: (130-138)/(75-86) 130/75   SpO2:  [92 %-94 %] 92 %  on    Device (Oxygen Therapy): room air    Intake/Output Summary (Last 24 hours) at 2019 1317  Last data filed at 2019 0954  Gross per 24 hour   Intake 330 ml   Output 1350 ml   Net -1020 ml     Body mass index is 22.84 kg/m².      19  1846 19  1323 19  0539   Weight: 72.1 kg (159 lb) 72.1 kg (159 lb) 72.2 kg (159 lb 3.2 oz)     Weight change:     Physical Exam   Constitutional: He appears well-developed and well-nourished. No distress.   HENT:   Head: Normocephalic and atraumatic.   Eyes: Conjunctivae are normal. No scleral icterus.   Neck: Neck supple. No tracheal deviation present.   Cardiovascular: Normal rate, regular rhythm, normal heart sounds and intact distal pulses.   No murmur heard.  Pulmonary/Chest: Effort normal. He has no wheezes.   Diminished bases. Rhonchi in RLL improved with cough   Abdominal: Soft. Bowel sounds are normal. There is no tenderness.   Genitourinary:   Genitourinary Comments:  Nephrostomy tube with clear yellow urine. Tenderness over nephrostomy insertion site.  Tea colored urine in rasheed bag   Musculoskeletal: He exhibits no edema.   Neurological: He is alert.   Oriented to person place and scenario   Skin: Skin is warm and dry. He is not diaphoretic.   Psychiatric: He has a normal mood and affect. His behavior is normal.   Nursing note and vitals reviewed.    Results Review:      Results from last 7 days   Lab Units 05/05/19  0503 05/02/19  0602 05/01/19  0539 04/30/19 2024   SODIUM mmol/L 137 141 137 136   POTASSIUM mmol/L 3.6 3.7 4.0 4.2   CHLORIDE mmol/L 106 110* 103 99   CO2 mmol/L 21.2* 21.6* 23.2 24.2   BUN mg/dL 20 22 30* 34*   CREATININE mg/dL 1.39* 1.21 1.47* 1.60*   GLUCOSE mg/dL 98 104* 116* 110*   CALCIUM mg/dL 10.3 9.9 10.6* 11.2*   AST (SGOT) U/L 13  --  16  --    ALT (SGPT) U/L 12  --  13  --      Estimated Creatinine Clearance: 43.3 mL/min (A) (by C-G formula based on SCr of 1.39 mg/dL (H)).  Results from last 7 days   Lab Units 05/01/19  0539   HEMOGLOBIN A1C % 5.20     Results from last 7 days   Lab Units 05/07/19  1259 05/07/19  0812 05/06/19  2013 05/06/19  1615 05/06/19  1116 05/06/19  0722 05/05/19  1938/19  1651   GLUCOSE mg/dL 136* 89 107 93 116 102 113 118         Results from last 7 days   Lab Units 04/30/19 2024   PROBNP pg/mL 670.4     Results from last 7 days   Lab Units 05/02/19  0602   TSH mIU/mL 0.969           Invalid input(s):  PHOS        Invalid input(s): LDLCALC  Results from last 7 days   Lab Units 05/05/19  0503 05/03/19  1505 05/02/19  0602 05/01/19  0539 04/30/19 2024   WBC 10*3/mm3 12.52* 11.94* 10.74 12.68* 14.45*   HEMOGLOBIN g/dL 9.3* 9.8* 7.2* 8.3* 8.6*   HEMATOCRIT % 29.0* 30.0* 22.4* 26.6* 27.8*   PLATELETS 10*3/mm3 351 362 320 334 345   MCV fL 86.1 84.5 83.6 84.7 85.5   MCH pg 27.6 27.6 26.9 26.4* 26.5*   MCHC g/dL 32.1 32.7 32.1 31.2* 30.9*   RDW % 15.4 14.8 15.0 14.9 14.8   RDW-SD fl 48.1 45.7 45.5 45.6 46.8   MPV fL 8.6 8.7  9.1 9.0 9.3   NEUTROPHIL % %  --   --   --  89.7* 90.1*   LYMPHOCYTE % %  --   --   --  3.3* 3.5*   MONOCYTES % %  --   --   --  6.1 5.5   EOSINOPHIL % %  --   --   --  0.1* 0.1*   BASOPHIL % %  --   --   --  0.2 0.2   IMM GRAN % %  --   --   --  0.6* 0.6*   NEUTROS ABS 10*3/mm3  --   --   --  11.38* 13.02*   LYMPHS ABS 10*3/mm3  --   --   --  0.42* 0.51*   MONOS ABS 10*3/mm3  --   --   --  0.77 0.80   EOS ABS 10*3/mm3  --   --   --  0.01 0.01   BASOS ABS 10*3/mm3  --   --   --  0.02 0.03   IMMATURE GRANS (ABS) 10*3/mm3  --   --   --  0.08* 0.08*   NRBC /100 WBC  --   --   --  0.0 0.0     Results from last 7 days   Lab Units 05/03/19  1034 04/30/19 2024   INR  1.32* 1.24*   APTT seconds 33.8  --          Results from last 7 days   Lab Units 04/30/19 2024   PROCALCITONIN ng/mL 0.19   LACTATE mmol/L 1.3             Results from last 7 days   Lab Units 04/30/19 2024 04/30/19 1959   BLOODCX  No growth at 5 days  No growth at 5 days  --    URINECX   --  <25,000 CFU/mL Mixed Clementina Isolated         Results from last 7 days   Lab Units 04/30/19 1959   NITRITE UA  Negative   WBC UA /HPF Too Numerous to Count*   BACTERIA UA /HPF None Seen   SQUAM EPITHEL UA /HPF 0-2   URINECX  <25,000 CFU/mL Mixed Clementina Isolated           Imaging:  Imaging Results (last 24 hours)     ** No results found for the last 24 hours. **          I reviewed the patient's new clinical results and medications.         Medication Review:   Scheduled Meds:    cefdinir 300 mg Oral Q12H   famotidine 20 mg Oral BID   insulin lispro 0-9 Units Subcutaneous 4x Daily With Meals & Nightly   pravastatin 40 mg Oral Daily   sodium chloride 3 mL Intravenous Q12H     Continuous Infusions:   PRN Meds:.•  acetaminophen  •  dextrose  •  dextrose  •  glucagon (human recombinant)  •  HYDROcodone-acetaminophen  •  HYDROmorphone **AND** naloxone  •  nitroglycerin  •  ondansetron  •  ondansetron **OR** ondansetron  •  oxyCODONE-acetaminophen  •  sodium  chloride    Problem List:     Active Hospital Problems    Diagnosis  POA   • **Aspiration pneumonia due to vomitus (CMS/HCC) [J69.0]  Unknown   • Mediastinal lymphadenopathy [R59.0]  Unknown   • Iron deficiency anemia due to chronic blood loss [D50.0]  Unknown   • Weight loss [R63.4]  Unknown   • Bladder mass [N32.89]  Unknown   • Bladder mass [N32.89]  Unknown   • Duodenitis [K29.80]  Unknown   • Anemia [D64.9]  Unknown      Resolved Hospital Problems   No resolved problems to display.       Assessment and Plan:     1. Aspiration pneumonia- on room air. Continue omnicef for 1 more day then stop. Add mucinex to help loosen secretions.   2. Bladder cancer with R ureteral obstruction from mass - s/p nephrostomy tube. reviewed surgery and oncology recommendations. Plan for removal of stent, nephrostomy tube and possible radiation today. Will need to do voiding trial afterwards.   3. Duodenitis- no further workup planned. continue omeprazole.   4. Iron deficiency anemia- likely from blood loss related to hematuria from bladder cancer. s/p 2 RBC's on 5/2/19. Check CBC in am, but has been stable.     VTE prophylaxis: SCDs   CODE status: full code at this point  Disposition: likely home with [facility] tomorrow    I discussed the patients findings and my recommendations with patient and family  And Dr. Moody. [facility] is working on getting equipment supplied at home including hospital bed. Patient and family are agreeable to home with [facility] and plans to do outpatient radiation treatments with urology office.     Shanta Barnes, LALA  05/07/19  1:20 PM

## 2019-05-07 NOTE — PROGRESS NOTES
REASON FOR FOLLOWUP/CHIEF COMPLAINT:  Advanced bladder cancer    HISTORY OF PRESENT ILLNESS:   Over the past 24 hours, patient and wife have decided on comfort care with hospice.  They plan to be discharged home tomorrow.    Past Medical History, Past Surgical History, Social History, Family History have been reviewed and are without significant changes except as mentioned.    Review of Systems   Review of Systems   Constitutional: Negative for activity change.   HENT: Negative for nosebleeds and trouble swallowing.    Respiratory: Negative for shortness of breath and wheezing.    Cardiovascular: Negative for chest pain and palpitations.   Gastrointestinal: Negative for constipation, diarrhea and nausea.   Genitourinary: Negative for dysuria.   Musculoskeletal: Negative for arthralgias and myalgias.   Neurological: Negative for seizures and syncope.   Hematological: Negative for adenopathy. Does not bruise/bleed easily.   Psychiatric/Behavioral: Negative for confusion.       Medications:  The current medication list was reviewed in the EMR    ALLERGIES:    Allergies   Allergen Reactions   • Amoxicillin Hives              Vitals:    05/06/19 1300 05/06/19 2010 05/06/19 2335 05/07/19 0809   BP: 118/70 138/83 132/86 130/75   BP Location: Left arm Right arm Right arm Left arm   Patient Position: Lying Lying Lying Lying   Pulse: 85 83 82 80   Resp: 16 16 16 16   Temp: 97.9 °F (36.6 °C) 98.4 °F (36.9 °C) 98.4 °F (36.9 °C) 98.2 °F (36.8 °C)   TempSrc: Oral Oral Oral Oral   SpO2:  93% 94% 92%   Weight:       Height:         Physical Exam    CONSTITUTIONAL:  Vital signs reviewed.  No distress, looks comfortable.  EYES:  Conjunctiva and lids unremarkable.  PERRLA  EARS,NOSE,MOUTH,THROAT:  Ears and nose appear unremarkable.  Lips, teeth, gums appear unremarkable.  RESPIRATORY:  Normal respiratory effort.  Lungs clear to auscultation bilaterally.  CARDIOVASCULAR:  Normal S1, S2.  No murmurs rubs or gallops.  No  significant lower extremity edema.  GASTROINTESTINAL: Abdomen appears unremarkable.  Nontender.  No hepatomegaly.  No splenomegaly.  NEURO: cranial nerves 2-12 grossly intact.  No focal deficits.  Appears to have equal strength all 4 extremities.  MUSCULOSKELETAL:  Unremarkable digits/nails.  No cyanosis or clubbing.  SKIN:  Warm.  No rashes.  PSYCHIATRIC:  Normal judgment and insight.  Normal mood and affect.        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  · T2 N1 M0, stage IIIa  PDL 1 pending.  CT AP 4/30/2019: Large bladder mass with severe right hydroureteronephrosis and right pelvic sidewall node 1.5 cm.  CT chest 5/1/2019: Multiple mediastinal nodes up to 1.3 cm, radiologist favored reactive.  Therefore, no clear evidence of distant disease.  However, with the locally advanced bladder tumor, this is felt to be unlikely curable.  Patient and wife have decided on comfort care with the help of hospice.  The wife states they are planning palliative radiation through Dr. Harris's office in hopes he will be able to urinate on his own.  They plan to go home tomorrow with hospice.    * Percutaneous nephrostomy tube on the right 5/3/2019    *Anemia  · On 4/30/2019, ferritin 649, but only 6% iron saturation.  Admitted due to hematuria.    *  Possible mild dementia    *  Presumed aspiration pneumonia    *Weight loss.  Lost 40 pounds since early January 2019.    *Poor performance status.  ECOG 4.     Recommendations  I agree with the plan of home with hospice.  Therefore, I have not made any follow-up in our office.  I discussed this case  with Dr. Moody.  We both agree with the patient's poor performance status, aggressive therapy such as surgery or systemic therapy would likely cause more harm than benefit.    Wife assisted with history.     I don't think I'm adding much at this point.  Will sign off.  Please call if I can be of any further assistance.  Thanks for the opportunity to help.

## 2019-05-07 NOTE — PLAN OF CARE
Problem: Patient Care Overview  Goal: Plan of Care Review   05/07/19 0857   Coping/Psychosocial   Plan of Care Reviewed With patient   Plan of Care Review   Progress improving   OTHER   Outcome Summary Improved tolerance to functional activity this day with an increase in gait distance and progression of ther. ex. protocol.

## 2019-05-07 NOTE — PLAN OF CARE
Problem: Patient Care Overview  Goal: Plan of Care Review  Outcome: Ongoing (interventions implemented as appropriate)   05/07/19 0413   Coping/Psychosocial   Plan of Care Reviewed With patient   Plan of Care Review   Progress no change   OTHER   Outcome Summary Patient continued to have some c/o pain in abdomen, treated with PRN Norco. Irrigated rasheed x 1 for c/o bladder fullness, draining with no problems and no resistant met. VSS. Paliative to see today. According to patient wife and notes, may try and internalize stent and remove rasheed today but no orders for procedure are seen at this time. Possibly home in next 1-2 days. Will continue to monitor.     Goal: Discharge Needs Assessment  Outcome: Ongoing (interventions implemented as appropriate)      Problem: Pneumonia (Adult)  Goal: Signs and Symptoms of Listed Potential Problems Will be Absent, Minimized or Managed (Pneumonia)  Outcome: Ongoing (interventions implemented as appropriate)      Problem: Urine Elimination Impaired (Adult)  Goal: Effective or Improved Urinary Elimination  Outcome: Ongoing (interventions implemented as appropriate)    Goal: Effective Containment of Urine  Outcome: Ongoing (interventions implemented as appropriate)    Goal: Reduced Incontinence Episodes  Outcome: Ongoing (interventions implemented as appropriate)      Problem: Fall Risk (Adult)  Goal: Absence of Fall  Outcome: Ongoing (interventions implemented as appropriate)      Problem: Skin Injury Risk (Adult)  Goal: Skin Health and Integrity  Outcome: Ongoing (interventions implemented as appropriate)      Problem: Nutrition, Imbalanced: Inadequate Oral Intake (Adult)  Goal: Improved Oral Intake  Outcome: Ongoing (interventions implemented as appropriate)    Goal: Prevent Further Weight Loss  Outcome: Ongoing (interventions implemented as appropriate)

## 2019-05-08 NOTE — PROGRESS NOTES
Continued Stay Note  [facility]     Patient Name: Modesto Diego  MRN: [id number]  Today's Date: 5/8/2019    Admit Date: 4/30/2019    Discharge Plan     Row Name 05/08/19 1534       Plan    Plan Comments  Spoke with Brunilda/Nicole and she is finished with paperwork for patient to dc home.  Nursing updated.  Wife will transport. Sagrario Stevenson RN    Final Discharge Disposition Code  50 - home with hospice    Final Note  DC home with Nicole. Sagrario Stevenson RN        Discharge Codes    No documentation.       Expected Discharge Date and Time     Expected Discharge Date Expected Discharge Time    May 8, 2019             Sagrario Stevenson RN

## 2019-05-08 NOTE — NURSING NOTE
Demonstrated and explained, to the wife and patient, with written education how to manage the rasheed.  Demonstrated how to change the bag from bed to leg, how to empty it, and how and when to clean it.  She demonstrated proper care with teach back.  Extra supplies given to her to take home.

## 2019-05-08 NOTE — PLAN OF CARE
Problem: Patient Care Overview  Goal: Plan of Care Review  Outcome: Ongoing (interventions implemented as appropriate)   05/08/19 3822   Coping/Psychosocial   Plan of Care Reviewed With patient   Plan of Care Review   Progress no change   OTHER   Outcome Summary Patient had some c/o pain in the beginning of the shift, controlled with PRN medication. [place] [facility] 1 and walker, had 2 BM's. Voided very little, straight performed at 0100 will continue to bladder scan and assess need for repeat straight cath. VSS. Plan is to go home with [facility] today. Will continue to monitor.     Goal: Discharge Needs Assessment  Outcome: Ongoing (interventions implemented as appropriate)      Problem: Pneumonia (Adult)  Goal: Signs and Symptoms of Listed Potential Problems Will be Absent, Minimized or Managed (Pneumonia)  Outcome: Ongoing (interventions implemented as appropriate)      Problem: Urine Elimination Impaired (Adult)  Goal: Effective or Improved Urinary Elimination  Outcome: Ongoing (interventions implemented as appropriate)    Goal: Effective Containment of Urine  Outcome: Ongoing (interventions implemented as appropriate)    Goal: Reduced Incontinence Episodes  Outcome: Ongoing (interventions implemented as appropriate)      Problem: Fall Risk (Adult)  Goal: Absence of Fall  Outcome: Ongoing (interventions implemented as appropriate)      Problem: Skin Injury Risk (Adult)  Goal: Skin Health and Integrity  Outcome: Ongoing (interventions implemented as appropriate)      Problem: Nutrition, Imbalanced: Inadequate Oral Intake (Adult)  Goal: Improved Oral Intake  Outcome: Ongoing (interventions implemented as appropriate)    Goal: Prevent Further Weight Loss  Outcome: Ongoing (interventions implemented as appropriate)

## 2019-05-08 NOTE — PROGRESS NOTES
"  First Urology Progress Note    Chief Complaint: Retention of urine    This 78-year-old gentleman is doing fairly well today.  He still very weak very lethargic and has really gotten out of bed.  Radiology was able to internalize his right nephrostomy tube to a stent and the nephrostomy tube is now removed.  We took his catheter out but he is required intermittent catheterization and unable to void on his own so I just talked to the nurse and will go ahead and replace his catheter.  We anticipate he will be discharged today with hospice care.    Review of Systems:    The following systems were reviewed and negative;  respiratory and cardiovascular          Vital Signs  /66 (BP Location: Left arm, Patient Position: Lying)   Pulse 71   Temp 98 °F (36.7 °C) (Oral)   Resp 18   Ht 177.8 cm (70\")   Wt 72.2 kg (159 lb 3.2 oz)   SpO2 94%   BMI 22.84 kg/m²     Physical Exam:     General Appearance:    Alert, cooperative, in no acute distress   Head:    Normocephalic, without obvious abnormality, atraumatic   Eyes:            Lids and lashes normal, conjunctivae and sclerae normal, no   icterus, no pallor, corneas clear, PERRLA   Ears:    Ears appear intact with no abnormalities noted   Throat:   No oral lesions, no thrush, oral mucosa moist   Neck:   No adenopathy, supple, trachea midline, no thyromegaly, no   carotid bruit, no JVD   Back:     No kyphosis present, no scoliosis present, no skin lesions,      erythema or scars, no tenderness to percussion or                   palpation,   range of motion normal   Lungs:     Clear to auscultation,respirations regular, even and                  unlabored    Heart:    Regular rhythm and normal rate, normal S1 and S2, no            murmur, no gallop, no rub, no click   Chest Wall:    No abnormalities observed   Abdomen:     Normal bowel sounds, no masses, no organomegaly, soft        non-tender, non-distended, no guarding, no rebound                tenderness "   Rectal:     Deferred   Extremities:   Moves all extremities well, no edema, no cyanosis, no             redness   Pulses:   Pulses palpable and equal bilaterally   Skin:   No bleeding, bruising or rash   Lymph nodes:   No palpable adenopathy   Neurologic:   Cranial nerves 2 - 12 grossly intact, sensation intact, DTR       present and equal bilaterally        Results Review:     I reviewed the patient's new clinical results.  Lab Results (last 24 hours)     Procedure Component Value Units Date/Time    POC Glucose Once [[id number]]  (Normal) Collected:  05/08/19 0724    Specimen:  Blood Updated:  05/08/19 0743     Glucose 113 mg/dL     Basic Metabolic Panel [[id number]]  (Abnormal) Collected:  05/08/19 0625    Specimen:  Blood Updated:  05/08/19 0726     Glucose 101 mg/dL      BUN 19 mg/dL      Creatinine 1.20 mg/dL      Sodium 133 mmol/L      Potassium 3.7 mmol/L      Chloride 101 mmol/L      CO2 23.4 mmol/L      Calcium 10.3 mg/dL      eGFR Non African Amer 58 mL/min/1.73      BUN/Creatinine Ratio 15.8     Anion Gap 8.6 mmol/L     Narrative:       GFR Normal >60  Chronic Kidney Disease <60  Kidney Failure <15    CBC & Differential [[id number]] Collected:  05/08/19 0625    Specimen:  Blood Updated:  05/08/19 0701    Narrative:       The following orders were created for panel order CBC & Differential.  Procedure                               Abnormality         Status                     ---------                               -----------         ------                     CBC Auto Differential[[id number]]        Abnormal            Final result                 Please view results for these tests on the individual orders.    CBC Auto Differential [[id number]]  (Abnormal) Collected:  05/08/19 0625    Specimen:  Blood Updated:  05/08/19 0701     WBC 11.56 10*3/mm3      RBC 3.38 10*6/mm3      Hemoglobin 9.2 g/dL      Hematocrit 29.2 %      MCV 86.4 fL      MCH 27.2 pg      MCHC 31.5 g/dL      RDW 15.6 %      RDW-SD 48.8 fl       MPV 8.5 fL      Platelets 345 10*3/mm3      Neutrophil % 88.8 %      Lymphocyte % 3.5 %      Monocyte % 5.4 %      Eosinophil % 1.0 %      Basophil % 0.3 %      Immature Grans % 1.0 %      Neutrophils, Absolute 10.25 10*3/mm3      Lymphocytes, Absolute 0.41 10*3/mm3      Monocytes, Absolute 0.63 10*3/mm3      Eosinophils, Absolute 0.12 10*3/mm3      Basophils, Absolute 0.04 10*3/mm3      Immature Grans, Absolute 0.11 10*3/mm3      nRBC 0.0 /100 WBC     POC Glucose Once [[id number]]  (Normal) Collected:  05/07/19 2044    Specimen:  Blood Updated:  05/07/19 2045     Glucose 109 mg/dL     POC Glucose Once [[id number]]  (Normal) Collected:  05/07/19 1647    Specimen:  Blood Updated:  05/07/19 1648     Glucose 89 mg/dL     Tissue Pathology Exam [[id number]] Collected:  05/02/19 0828    Specimen:  Tissue from Urinary Bladder Updated:  05/07/19 1455     Case Report --     Surgical Pathology Report                         Case: [id number]                                  Authorizing Provider:  Gregorio Harris MD   Collected:           05/02/2019 08:28 AM          Ordering Location:     [facility]  Received:            05/02/2019 10:26 AM                                 MAIN OR                                                                      Pathologist:           Simone Han MD                                                         Specimen:    Urinary Bladder, bladder mass                                                               Final Diagnosis --     1. Bladder Mass, TURBT:   A. Invasive high grade urothelial carcinoma.   B. Muscularis propria focally present and involved by invasive carcinoma.   C. No definitive lymphovascular space invasion identified by routine staining.   D. Focal perineural invasion is identified.      jat/jse            Synoptic Checklist --     URINARY BLADDER: Biopsy and Transurethral Resection of Bladder Tumor (TURBT)  (Bladder Bx - All  "Specimens)      SPECIMEN     Procedure:    TURBT     TUMOR     Tumor Site:    Not specified      Histologic Type:    Urothelial carcinoma, invasive      Histologic Grade:    G3: Poorly differentiated      Tumor Extent:           Tumor Extension:    Tumor invades muscularis propria      Accessory Findings:           Tumor Configuration:    Ulcerated        Muscularis Propria Presence:    Muscularis propria (detrusor muscle) present        Lymphovascular Invasion:    Not identified     ADDITIONAL FINDINGS     Additional Pathologic Findings:    perineural invasion        Comment --     Sections from the TURB demonstrate deeply invasive poorly differentiated carcinoma morphologically consistent with urothelial carcinoma.  Focal perineural invasion is identified.  Exact depth of invasion cannot be ascertained from the biopsy specimen.  A short panel of immunoperoxidase stains is performed and reported below.  Routine stained section was shared in internal consultation with Dr. Shannon, who concurred with the above diagnosis.    jat/anne        Gross Description --     1. Received in formalin labeled \"bladder mass\" is a 6 x 5 x 2 cm aggregate of tan-pink friable and cauterized tissue fragments which is entirely submitted as 1A-1E.    Jap/uso/jat/kds       Special Stains --     Utilizing appropriate positive and negative controls, immunohistochemical stains CK7, P40, amikar, PSA, NKX 3.1 and P63 are performed on block 1D.  The tumor shows focal positivity for CK7 and rather diffuse positivity for P40, and P63.  The tumor is negative for Amikar, PSA, and NKX 3;1.  This immunoprofile supports the rendered histologic diagnosis urothelial carcinoma.     mec/brb        Microscopic Description --     Performed, incorporated in diagnosis.       POC Glucose Once [[id number]]  (Abnormal) Collected:  05/07/19 1259    Specimen:  Blood Updated:  05/07/19 1301     Glucose 136 mg/dL         Imaging Results (last 24 hours)     Procedure " Component Value Units Date/Time    IR Ureteral Catheter or Stent Placement [[id number]] Collected:  05/07/19 1807     Updated:  05/07/19 1807    Narrative:       FLUOROSCOPIC GUIDED RIGHT PERCUTANEOUS NEPHROSTOGRAM AND PERCUTANEOUS  INTERNALIZATION OF DOUBLE-J RIGHT URETERAL STENT 05/07/2019     HISTORY: Obstructive uropathy.     FINDINGS:  Iodinated contrast was injected through the indwelling right  percutaneous nephrostomy tube. There is dilatation of the right  collecting system. Contrast did not initially pass into the right mid  ureter.  An 0.035 wire was passed and eventually was able to be  maneuvered into the mid to distal ureter. The nephrostomy tube was  removed. A 4-Citizen [place] MPA catheter was then placed. An Amplatz wire was  able to be advanced into the urinary bladder. The catheter was removed  over an 0.035 guidewire. The 8-Citizen [place] 24 cm double-J right ureteral  stent was able to be placed into the urinary bladder. Its inferior coil  was left in the urinary bladder and its superior coil was left in a  dilated right collecting system. There was tortuosity of the distal  ureter. There was also some tortuosity of the proximal ureter near the  ureteropelvic junction.     Patient tolerated the procedure well. Confirmatory images were obtained.     Fluoroscopy time 7 minutes 29 seconds, and 6 images.                Medication Review:   I have personally reviewed    Current Facility-Administered Medications:   •  acetaminophen (TYLENOL) tablet 650 mg, 650 mg, Oral, Q4H PRN, Pamela Cronin MD  •  dextrose (D50W) 25 g/ 50mL Intravenous Solution 25 g, 25 g, Intravenous, Q15 Min PRN, Pamela Cronin MD  •  dextrose (GLUTOSE) oral gel 15 g, 15 g, Oral, Q15 Min PRN, Pamela Cronin MD  •  famotidine (PEPCID) tablet 20 mg, 20 mg, Oral, BID, Tyrese Moody MD, 20 mg at 05/08/19 0847  •  glucagon (human recombinant) (GLUCAGEN DIAGNOSTIC) injection 1 mg, 1 mg, Subcutaneous, PRN, Pamela Cronin MD  •  guaiFENesin (MUCINEX)  12 hr tablet 600 mg, 600 mg, Oral, BID, [place], APRN, 600 mg at 05/08/19 0847  •  HYDROcodone-acetaminophen (NORCO) 7.5-325 MG per tablet 1 tablet, 1 tablet, Oral, Q6H PRN, Tyrese Moody MD, 1 tablet at 05/07/19 2048  •  HYDROmorphone (DILAUDID) injection 0.5 mg, 0.5 mg, Intravenous, Q2H PRN **AND** naloxone (NARCAN) injection 0.1 mg, 0.1 mg, Intravenous, Q5 Min PRN, Gregorio Harris MD  •  insulin lispro (humaLOG) injection 0-9 Units, 0-9 Units, Subcutaneous, 4x Daily With Meals & Nightly, Pamela Cronin MD  •  nitroglycerin (NITROSTAT) SL tablet 0.4 mg, 0.4 mg, Sublingual, Q5 Min PRN, Pamela Cronin MD  •  ondansetron (ZOFRAN) injection 4 mg, 4 mg, Intravenous, Q6H PRN, Pamela Cronin MD  •  ondansetron (ZOFRAN) tablet 4 mg, 4 mg, Oral, Q6H PRN **OR** ondansetron (ZOFRAN) injection 4 mg, 4 mg, Intravenous, Q6H PRN, Tyrese Moody MD  •  oxyCODONE-acetaminophen (PERCOCET) 7.5-325 MG per tablet 1 tablet, 1 tablet, Oral, Q4H PRN, Gregorio Harris MD, 1 tablet at 05/06/19 0516  •  pravastatin (PRAVACHOL) tablet 40 mg, 40 mg, Oral, Daily, Pamela Cronin MD, 40 mg at 05/08/19 0847  •  sodium chloride 0.9 % flush 1-10 mL, 1-10 mL, Intravenous, PRN, Tyrese Moody MD  •  sodium chloride 0.9 % flush 3 mL, 3 mL, Intravenous, Q12H, Pamela Cronin MD, 3 mL at 05/07/19 0947  •  sodium chloride 0.9 % flush 3 mL, 3 mL, Intravenous, Q12H, Tyrese Moody MD, 3 mL at 05/08/19 0848  •  sodium chloride 0.9 % flush 3-10 mL, 3-10 mL, Intravenous, PRN, Pamela Cronin MD    Allergies:    Amoxicillin    Assessment:    Active Problems:  Patient Active Problem List   Diagnosis   • Weight loss   • Aspiration pneumonia due to vomitus (CMS/HCC)   • Bladder mass   • Bladder mass   • Duodenitis   • Anemia   • Mediastinal lymphadenopathy   • Iron deficiency anemia due to chronic blood loss       Invasive bladder cancer considered to be poor surgical as well as a medical candidate for chemotherapy and  radiation therapy    Plan:    He may be discharged today after we replace his Bey catheter under hospice care.  We will be available to discuss any issues that come up.  I do not think he needs to come to the office due to transport issues and I told his wife to call me if there is any issues that need be addressed.      Gregorio Harris MD    5/8/2019  11:26 AM

## 2019-05-08 NOTE — DISCHARGE SUMMARY
[facility]    Date of Discharge:  5/8/2019    PCP: Mg Root MD    Discharge Diagnosis:   Active Hospital Problems    Diagnosis  POA   • **Aspiration pneumonia due to vomitus (CMS/HCC) [J69.0]  Unknown   • Mediastinal lymphadenopathy [R59.0]  Unknown   • Iron deficiency anemia due to chronic blood loss [D50.0]  Unknown   • Weight loss [R63.4]  Unknown   • Bladder mass [N32.89]  Unknown   • Bladder mass [N32.89]  Unknown   • Duodenitis [K29.80]  Unknown   • Anemia [D64.9]  Unknown      Resolved Hospital Problems   No resolved problems to display.     Procedures Performed  Procedure(s):  CYSTOSCOPY TRANSURETHRAL RESECTION OF BLADDER TUMOR AND LEFT RETROGRADE PYLOGRAM     Consults     Date and Time Order Name Status Description    5/3/2019 0957 Inpatient Medical Oncology Consult Completed     4/30/2019 2204 Inpatient Gastroenterology Consult Completed     4/30/2019 2204 Inpatient Urology Consult Completed         Hospital Course  Please see history and physical for details. Patient is a 78 y.o. male initially admitted to sent by PCP for abnormal CT scan in addition to weight loss.  Ultimately urology was consulted and patient underwent cystoscopy.  This revealed an extremely large bladder mass which was concerning for malignancy.  Pathology revealed an highly invasive urothelial carcinoma.  Secondary to the extensive of this as well as the fact that he already had metastasis to outlying lymph nodes, comfort care was endorsed.  Initially he declined that idea stating that he wanted to do everything that was offered.  Unfortunately in this case not too much is being offered as he cannot undergo such radical surgical intervention nor is it even really being endorsed by urology at this juncture prior discussions.  Dr. Diego with oncology saw the patient and ultimately we can not even offer any type of chemotherapy or radiation in this case secondary to poor  performance scores.  We did offer the thoughts of palliative chemo and ultimately that was declined.  Patient at this juncture was seen by hospice again and it is now the thought that we will transition goals of care to 1 of comfort and send this patient home with hospice.  He did have a nephrostomy tube placed on the right side and that has since been removed and urology would like to replace a Bey catheter prior to discharge.  His pain med requirements have been minimal at this juncture but I expect them to increase in due time.  A lot of his unnecessary medications at discharge will be discontinued.  Antibiotics have been completed while here due to presence of pneumonia but he has no cardiopulmonary complaints and is stable on room air.  All questions were answered to patient but more so spouse at bedside.  Both of which involved at this juncture are minimal to the above plan.            Condition on Discharge: Improved.     Temp:  [98 °F (36.7 °C)-98.4 °F (36.9 °C)] 98 °F (36.7 °C)  Heart Rate:  [71-93] 71  Resp:  [18-24] 18  BP: (117-141)/(66-78) 117/66  Body mass index is 22.84 kg/m².    Physical Exam   Constitutional: He is oriented to person, place, and time. He appears well-developed.   HENT:   Head: Normocephalic.   Eyes: EOM are normal. Pupils are equal, round, and reactive to light.   Neck: Neck supple.   Cardiovascular: Normal rate and regular rhythm.   Pulmonary/Chest: Effort normal and breath sounds normal. No respiratory distress.   Abdominal: Soft. Bowel sounds are normal.   Nephrostomy tube removed on right side   Musculoskeletal: He exhibits no edema.   Neurological: He is alert and oriented to person, place, and time. No cranial nerve deficit.        Discharge Medications      New Medications      Instructions Start Date   HYDROcodone-acetaminophen 7.5-325 MG per tablet  Commonly known as:  NORCO   1 tablet, Oral, Every 6 Hours PRN         Stop These Medications    pravastatin 40 MG  tablet  Commonly known as:  PRAVACHOL             Additional Instructions for the Follow-ups that You Need to Schedule     Discharge Follow-up with PCP   As directed       Currently Documented PCP:    Mg Root MD    PCP Phone Number:    [phone]     Follow Up Details:  home with hospice           Follow-up Information     Mg Root MD Follow up.    Specialty:  Family Medicine  Contact information:  [address] [facility] [id number] [phone]             Mg Root MD .    Specialty:  Family Medicine  Why:  home with hospice  Contact information:  [address] [id number] [phone]                 Test Results Pending at Discharge     Tyrese Moody MD  05/08/19  1:53 PM    Discharge time spent greater than 30 minutes.

## 2019-05-09 NOTE — OUTREACH NOTE
Prep Survey      Responses   Facility patient discharged from?  [facility]   Is patient eligible?  No   What are the reasons patient is not eligible?  Hospice/Pallative Care   Does the patient have one of the following disease processes/diagnoses(primary or secondary)?  Other   Prep survey completed?  Yes          Prachi Harris RN

## (undated) DEVICE — TUBING, SUCTION, 1/4" X 20', STRAIGHT: Brand: MEDLINE INDUSTRIES, INC.

## (undated) DEVICE — LP CUT RT/ANGL 26FR

## (undated) DEVICE — NITINOL WIRE WITH HYDROPHILIC TIP: Brand: SENSOR

## (undated) DEVICE — LOU TUR: Brand: MEDLINE INDUSTRIES, INC.

## (undated) DEVICE — CATH FOL SIMPLASTIC 3WY 24F 30CC

## (undated) DEVICE — TIDISHIELD UROLOGY DRAIN BAGS FROSTY VINYL FITS SIEMENS UROSKOP ACCESS 20 PER CASE: Brand: TIDISHIELD

## (undated) DEVICE — LOU CYSTO: Brand: MEDLINE INDUSTRIES, INC.

## (undated) DEVICE — BAG,DRAINAGE,4L,A/R TOWER,LL,SLIDE TAP: Brand: MEDLINE

## (undated) DEVICE — GLV SURG BIOGEL LTX PF 7

## (undated) DEVICE — CATH URETRL FLXITP POLLACK STD 5F 70CM